# Patient Record
Sex: MALE | Race: WHITE | Employment: FULL TIME | URBAN - METROPOLITAN AREA
[De-identification: names, ages, dates, MRNs, and addresses within clinical notes are randomized per-mention and may not be internally consistent; named-entity substitution may affect disease eponyms.]

---

## 2017-04-14 PROBLEM — R16.1 SPLENOMEGALY: Status: ACTIVE | Noted: 2017-04-14

## 2018-12-10 ENCOUNTER — HOSPITAL ENCOUNTER (OUTPATIENT)
Dept: PHYSICAL THERAPY | Age: 53
Setting detail: THERAPIES SERIES
Discharge: HOME | End: 2018-12-10
Payer: COMMERCIAL

## 2018-12-10 ENCOUNTER — TELEPHONE (OUTPATIENT)
Dept: ORTHOPEDIC SURGERY | Age: 53
End: 2018-12-10

## 2018-12-10 PROCEDURE — G8979 MOBILITY GOAL STATUS: HCPCS | Performed by: PHYSICAL THERAPIST

## 2018-12-10 PROCEDURE — 97110 THERAPEUTIC EXERCISES: CPT | Performed by: PHYSICAL THERAPIST

## 2018-12-10 PROCEDURE — 97161 PT EVAL LOW COMPLEX 20 MIN: CPT | Performed by: PHYSICAL THERAPIST

## 2018-12-10 PROCEDURE — 97112 NEUROMUSCULAR REEDUCATION: CPT | Performed by: PHYSICAL THERAPIST

## 2018-12-10 PROCEDURE — 97016 VASOPNEUMATIC DEVICE THERAPY: CPT | Performed by: PHYSICAL THERAPIST

## 2018-12-10 PROCEDURE — G8978 MOBILITY CURRENT STATUS: HCPCS | Performed by: PHYSICAL THERAPIST

## 2018-12-10 NOTE — FLOWSHEET NOTE
[address], [address]  Phone: [phone]   Fax:     [phone]    Physical Therapy Daily Treatment Note  Date:  12/10/2018    Patient Name:  Dawood Nieves    :  1965  MRN: [id number]  Restrictions/Precautions:    Medical/Treatment Diagnosis Information:  · Diagnosis: 719.06, M25.462 - Effusion, left knee   · Treatment Diagnosis: Left knee pain, effuson Y64.071/200.38  Insurance/Certification information:  PT Insurance Information: [facility]   Physician Information:  Referring Practitioner: Max Herrera PA-C   Plan of care signed (Y/N):     Date of Patient follow up with Physician: barb Quinonez     G-Code (if applicable):      Date G-Code Applied:  12/10  PT G-Codes  Functional Assessment Tool Used: LEFS  Score: 31/0 - 61.25% deficit   Functional Limitation: Mobility: Walking and moving around  Mobility: Walking and Moving Around Current Status ():  At least 60 percent but less than 80 percent impaired, limited or restricted  Mobility: Walking and Moving Around Goal Status (): 0 percent impaired, limited or restricted    Progress Note: [x]  Yes  []  No  Next due by: Visit #10       Latex Allergy:  [x]NO      []YES  Preferred Language for Healthcare:   [x]English       []other:    Visit # Insurance Allowable   1 12     Pain level:  3-10/10     SUBJECTIVE:  See eval    OBJECTIVE: See eval  Observation:   Test measurements:      RESTRICTIONS/PRECAUTIONS:     Exercises/Interventions:   Exercise/Equipment Resistance/Repetitions Other comments   Stretching     Hamstring 5x30\"     Towel Pull 5x30\"     Inclined Calf     Hip Flexion     ITB     Groin     Quad                    SLR     Supine 3x10     Abduction 3x10     Adduction     Prone     SLR+          Isometrics     Quad sets 10x10\"          Patellar Glides     Medial     Superior     Inferior          ROM     Sheet Pulls 10x10\" Progression Towards Functional goals:  [] Patient is progressing as expected towards functional goals listed. [] Progression is slowed due to complexities listed. [] Progression has been slowed due to co-morbidities. [x] Plan just implemented, too soon to assess goals progression  [] Other:     ASSESSMENT:  See eval    Treatment/Activity Tolerance:  [x] Patient tolerated treatment well [] Patient limited by fatique  [] Patient limited by pain  [] Patient limited by other medical complications  [] Other:     Patient education:  12/10 Patient education on PT and plan of care including diagnosis, prognosis, treatment goals and options. Patient agrees with discussed POC and treatment and is aware of rehab process. Pt was also educated on clinic layout and use of modalities. Prognosis: [x] Good [] Fair  [] Poor    Patient Requires Follow-up: [x] Yes  [] No    PLAN: See cheko  [] Continue per plan of care [] Alter current plan (see comments)  [x] Plan of care initiated [] Hold pending MD visit [] Discharge    Electronically signed by: Evangelina Chan PT, DPT    *If patient does not return for further follow ups after this date. Please consider this as the patients discharge from physical therapy.

## 2018-12-10 NOTE — PLAN OF CARE
The [address] [place] [address]  Phone [phone]  Fax [phone]                                                       Physical Therapy Certification    Dear Referring Practitioner: Jung Olsen PA-C ,    We had the pleasure of evaluating the following patient for physical therapy services at [address] [place] [address]. A summary of our findings can be found in the initial assessment below. This includes our plan of care. If you have any questions or concerns regarding these findings, please do not hesitate to contact me at the office phone number checked above. Thank you for the referral.       Physician Signature:_______________________________Date:__________________  By signing above (or electronic signature), therapists plan is approved by physician      Patient: Dayana Luong   : 1965   MRN: [id number]  Referring Physician: Referring Practitioner: Jung Olsen PA-C       Evaluation Date: 12/10/2018      Medical Diagnosis Information:  Diagnosis: 719.06, M25.462 - Effusion, left knee    Treatment Diagnosis: Left knee pain, effuson M25.462/719.06                                         Insurance information: PT Insurance Information: [facility]      Precautions/ Contra-indications:   Latex Allergy:  [x]NO      []YES  Preferred Language for Healthcare:   [x]English       []other:    SUBJECTIVE: Patient stated complaint: Pt reports he injured his knee when he stepped in a hole in a parking lot - he twisted his left knee. He finished out the work night but then took 2 nights off. When he did go back to work he noticed increased swelling and pain. Ice and compression did seem to help. He has been getting \"pinching\" in the knee joint line. He states his knee progressively gets worse over the week. Currently, he is on light duty for work.       Injury - strength training, ROM, for Lower extremity and core   [x]  NMR activation and proprioception for LE, Glutes and Core   [x]  Manual therapy as indicated for LE, Hip and spine to include: Dry Needling/IASTM, STM, PROM, Gr I-IV mobilizations, manipulation. [x] Modalities as needed that may include: thermal agents, E-stim, Biofeedback, US, iontophoresis as indicated  [x] Patient education on joint protection, postural re-education, activity modification, progression of HEP. HEP instruction: (see scanned forms)    GOALS:  Patient stated goal: Return to work without pain or restriction     Therapist goals for Patient:   Short Term Goals: To be achieved in: 2-4 weeks  1. Independent in HEP and progression per patient tolerance, in order to prevent re-injury. 2. Patient will have a decrease in pain to facilitate improvement in movement, function, and ADLs as indicated by Functional Deficits. Long Term Goals: To be achieved in: 6-8 weeks  1. Disability index score of 20% or less for the LEFS to assist with reaching prior level of function. 2. Patient will demonstrate increased AROM to WNL (0-110 deg) to allow for proper joint functioning as indicated by patients Functional Deficits. 3. Patient will demonstrate an increase in Strength to good proximal hip strength and control in LE (MMT at least 4+/5) to allow for proper functional mobility as indicated by patients Functional Deficits. 4. Patient will return to daily functional activities without increased symptoms or restriction. 5. Patient will return to work without restrictions.      Electronically signed by:  Evangelina Chan PT

## 2018-12-11 ENCOUNTER — OFFICE VISIT (OUTPATIENT)
Dept: ORTHOPEDIC SURGERY | Age: 53
End: 2018-12-11
Payer: COMMERCIAL

## 2018-12-11 VITALS
BODY MASS INDEX: 28.23 KG/M2 | HEIGHT: 74 IN | DIASTOLIC BLOOD PRESSURE: 86 MMHG | HEART RATE: 72 BPM | SYSTOLIC BLOOD PRESSURE: 130 MMHG | WEIGHT: 220 LBS

## 2018-12-11 DIAGNOSIS — M25.562 LEFT KNEE PAIN, UNSPECIFIED CHRONICITY: Primary | ICD-10-CM

## 2018-12-11 PROCEDURE — 99204 OFFICE O/P NEW MOD 45 MIN: CPT | Performed by: ORTHOPAEDIC SURGERY

## 2018-12-11 NOTE — PROGRESS NOTES
Father     Hypertension Father     Kidney Disease Father        Social History     Social History    Marital status:      Spouse name: N/A    Number of children: N/A    Years of education: N/A     Social History Main Topics    Smoking status: Never Smoker    Smokeless tobacco: Never Used    Alcohol use Yes    Drug use: No    Sexual activity: Not Asked     Other Topics Concern    None     Social History Narrative    None       Current Outpatient Prescriptions   Medication Sig Dispense Refill    hydrochlorothiazide (HYDRODIURIL) 25 MG tablet Take 25 mg by mouth daily      lisinopril (PRINIVIL;ZESTRIL) 20 MG tablet Take 20 mg by mouth daily      Fexofenadine-Pseudoephedrine (ALLEGRA-D PO) Take by mouth      pravastatin (PRAVACHOL) 40 MG tablet Take 40 mg by mouth daily      aspirin 81 MG tablet Take 81 mg by mouth daily      tamsulosin (FLOMAX) 0.4 MG capsule Take 0.4 mg by mouth daily      omeprazole (PRILOSEC) 20 MG delayed release capsule Take 20 mg by mouth daily       No current facility-administered medications for this visit. No Known Allergies    Vital signs:  /86   Pulse 72   Ht 6' 2\" (1.88 m)   Wt 220 lb (99.8 kg)   BMI 28.25 kg/m²        Neuro: Alert & oriented x 3,  normal,  no focal deficits noted. Normal affect. Eyes: sclera clear  Ears: Normal external ear  Mouth:  No perioral lesions  Pulm: Respirations unlabored and regular  Pulse: Regular rate    Skin: Warm, well perfused        Left knee exam    Gait: No use of assistive devices. No antalgic gait. Alignment: Alignment appreciated. Inspection/skin: Quadriceps well developed. Skin is intact without erythema or ecchymosis. No gross deformity. Palpation: Minimal patellofemoral crepitus, positive tenderness along the medial joint line. Nontender along joint line. No pain with compression of patella. Nontender to light touch.   He has tenderness to palpation over the medial femoral epicondyles and fracture, soft tissue calcification or any other acute osseous pathology. Assessment: Left knee pain, suspect medial meniscus tear, and or grade 1 MCL sprain - injury 5 weeks ago    Plan: I discussed treatment options the patient. I recommended we obtain an MRI of his knee to rule out a medial meniscus tear. He is MRI is approved through worker's compensation for this. I will see him back following this test to discuss the imaging results. Jeannine Turk is in agreement with this plan. All questions were answered to patient's satisfaction and was encouraged to call with any further questions. Beatrice Pitts. Yvette Morrison, [address] [facility] Sports Medicine and [facility]     This dictation was performed with a verbal recognition program [facility] [employer] [facility]) and it was checked for errors. It is possible that there are still dictated errors within this office note. If so, please bring any errors to my attention for an addendum. All efforts were made to ensure that this office note is accurate. I supervised my sports medicine fellow in the evaluation and development of a treatment plan  for this patient. I personally interviewed the patient and performed a physical examination. In addition, I discussed the patient's condition and treatment options with them. All of their questions were answered. I personally reviewed the patient's pain scale, review of systems, family history, social history, past medical history, allergies and medications. 13 point review of systems was collected today and is filed in the medical record. Kahlil Hill MD  Sports Medicine, Arthroscopic Knee and Shoulder Surgery    This dictation was performed with a verbal recognition program [facility] [employer] [facility]) and it was checked for errors. It is possible that there are still dictated errors within this office note. If so, please bring any errors to my attention for an addendum.   All efforts were made to ensure that this office note is accurate.

## 2018-12-13 ENCOUNTER — HOSPITAL ENCOUNTER (OUTPATIENT)
Dept: PHYSICAL THERAPY | Age: 53
Setting detail: THERAPIES SERIES
Discharge: HOME | End: 2018-12-13
Payer: COMMERCIAL

## 2018-12-13 PROCEDURE — 97110 THERAPEUTIC EXERCISES: CPT | Performed by: PHYSICAL THERAPIST

## 2018-12-13 PROCEDURE — 97016 VASOPNEUMATIC DEVICE THERAPY: CPT | Performed by: PHYSICAL THERAPIST

## 2018-12-13 PROCEDURE — 97112 NEUROMUSCULAR REEDUCATION: CPT | Performed by: PHYSICAL THERAPIST

## 2018-12-13 NOTE — FLOWSHEET NOTE
[facility], [address] [employer] [address] [place] [address], [address] [place] [address]  Phone: [phone]   Fax:     [phone]    Physical Therapy Daily Treatment Note  Date:  2018    Patient Name:  Sarah Gan    :  1965  MRN: [id number]  Restrictions/Precautions:    Medical/Treatment Diagnosis Information:  · Diagnosis: 719.06, M25.462 - Effusion, left knee   · Treatment Diagnosis: Left knee pain, effuson D48.546/278.35  Insurance/Certification information:  PT Insurance Information: [facility]   Physician Information:  Referring Practitioner: Mahogany Shanks PA-C   Plan of care signed (Y/N):     Date of Patient follow up with Physician: sees Dr. Arlen Guzman     G-Code (if applicable):      Date G-Code Applied:  12/10       Progress Note: [x]  Yes  []  No  Next due by: Visit #10       Latex Allergy:  [x]NO      []YES  Preferred Language for Healthcare:   [x]English       []other:    Visit # Insurance Allowable   2   12     Pain level:  2-3/10     SUBJECTIVE:  Pt reports he was able to do his exercises without issues. Saw Dr. Arlen Guzman yesterday - getting his MRI Saturday and will follow up with him again afterward.      OBJECTIVE: See eval  Observation:   Test measurements:      RESTRICTIONS/PRECAUTIONS:     Exercises/Interventions:   Exercise/Equipment Resistance/Repetitions Other comments   Stretching     Hamstring 5x30\"     Towel Pull 5x30\"     Inclined Calf NPV     Hip Flexion     ITB     Groin     Quad                    SLR     Supine 3x10     Abduction 3x10     Adduction     Prone     SLR+ NPV          Isometrics     Quad sets 10x10\"     Ball squeeze  3x10  Added              Patellar Glides     Medial     Superior     Inferior          ROM     Sheet Pulls 10x10\"     Hang Weights     Passive     Active     Weight Shift     Ankle Pumps                    CKC     Calf raises 3x10  Added /   Wall sits     Step ups     1 leg stand

## 2018-12-18 ENCOUNTER — HOSPITAL ENCOUNTER (OUTPATIENT)
Dept: PHYSICAL THERAPY | Age: 53
Setting detail: THERAPIES SERIES
Discharge: HOME | End: 2018-12-18
Payer: COMMERCIAL

## 2018-12-18 PROCEDURE — 97110 THERAPEUTIC EXERCISES: CPT | Performed by: PHYSICAL THERAPIST

## 2018-12-18 PROCEDURE — 97112 NEUROMUSCULAR REEDUCATION: CPT | Performed by: PHYSICAL THERAPIST

## 2018-12-18 PROCEDURE — 97016 VASOPNEUMATIC DEVICE THERAPY: CPT | Performed by: PHYSICAL THERAPIST

## 2018-12-18 NOTE — FLOWSHEET NOTE
therapy to mobilize LE, proximal hip and/or LS spine soft tissue/joints for the purpose of modulating pain, promoting relaxation,  increasing ROM, reducing/eliminating soft tissue swelling/inflammation/restriction, improving soft tissue extensibility and allowing for proper ROM for normal function with self care, mobility, lifting and ambulation. Modalities:  Vaso 15'     Charges:  Timed Code Treatment Minutes: 45   Total Treatment Minutes: 331-771     919-246 2TE  229-320 NM  850-905 Vaso     [] EVAL (LOW) 69584 (typically 20 minutes face-to-face)  [] EVAL (MOD) 56832 (typically 30 minutes face-to-face)  [] EVAL (HIGH) 99711 (typically 45 minutes face-to-face)  [] RE-EVAL   [x] [place](49561) x  2   [] IONTO  [x] NMR (29240) x  1   [x] VASO  [] Manual (45578) x       [] Other:  [] TA x       [] Mech Traction (01315)  [] ES(attended) (52507)      [] ES (un) (11970):     GOALS:   Patient stated goal: Return to work without pain or restriction      Therapist goals for Patient:   Short Term Goals: To be achieved in: 2-4 weeks  1. Independent in HEP and progression per patient tolerance, in order to prevent re-injury. 2. Patient will have a decrease in pain to facilitate improvement in movement, function, and ADLs as indicated by Functional Deficits.     Long Term Goals: To be achieved in: 6-8 weeks  1. Disability index score of 20% or less for the LEFS to assist with reaching prior level of function. 2. Patient will demonstrate increased AROM to WNL (0-110 deg) to allow for proper joint functioning as indicated by patients Functional Deficits. 3. Patient will demonstrate an increase in Strength to good proximal hip strength and control in LE (MMT at least 4+/5) to allow for proper functional mobility as indicated by patients Functional Deficits. 4. Patient will return to daily functional activities without increased symptoms or restriction.        Progression Towards Functional goals:  [] Patient is progressing

## 2018-12-19 ENCOUNTER — TELEPHONE (OUTPATIENT)
Dept: ORTHOPEDIC SURGERY | Age: 53
End: 2018-12-19

## 2018-12-21 ENCOUNTER — HOSPITAL ENCOUNTER (OUTPATIENT)
Dept: PHYSICAL THERAPY | Age: 53
Setting detail: THERAPIES SERIES
Discharge: HOME | End: 2018-12-21
Payer: COMMERCIAL

## 2018-12-21 ENCOUNTER — OFFICE VISIT (OUTPATIENT)
Dept: ORTHOPEDIC SURGERY | Age: 53
End: 2018-12-21
Payer: COMMERCIAL

## 2018-12-21 VITALS
BODY MASS INDEX: 28.23 KG/M2 | DIASTOLIC BLOOD PRESSURE: 89 MMHG | HEIGHT: 74 IN | SYSTOLIC BLOOD PRESSURE: 139 MMHG | HEART RATE: 79 BPM | WEIGHT: 220 LBS

## 2018-12-21 DIAGNOSIS — S83.242A TEAR OF MEDIAL MENISCUS OF LEFT KNEE, UNSPECIFIED TEAR TYPE, UNSPECIFIED WHETHER OLD OR CURRENT TEAR, INITIAL ENCOUNTER: Primary | ICD-10-CM

## 2018-12-21 PROCEDURE — 97016 VASOPNEUMATIC DEVICE THERAPY: CPT | Performed by: PHYSICAL THERAPIST

## 2018-12-21 PROCEDURE — 97110 THERAPEUTIC EXERCISES: CPT | Performed by: PHYSICAL THERAPIST

## 2018-12-21 PROCEDURE — 99213 OFFICE O/P EST LOW 20 MIN: CPT | Performed by: ORTHOPAEDIC SURGERY

## 2018-12-21 PROCEDURE — 97112 NEUROMUSCULAR REEDUCATION: CPT | Performed by: PHYSICAL THERAPIST

## 2018-12-21 NOTE — FLOWSHEET NOTE
The [address] [place], [place] [address], [address]  Phone: [phone]   Fax:     [phone]    Physical Therapy Daily Treatment Note  Date:  2018    Patient Name:  Seabron Oppenheim    :  1965  MRN: [id number]  Restrictions/Precautions:    Medical/Treatment Diagnosis Information:  · Diagnosis: 719.06, M25.462 - Effusion, left knee   · Treatment Diagnosis: Left knee pain, effuson V29.673/812.66  Insurance/Certification information:  PT Insurance Information: [facility]   Physician Information:  Referring Practitioner: Alycia Reardon PA-C   Plan of care signed (Y/N):     Date of Patient follow up with Physician: barb Nassar     G-Code (if applicable):      Date G-Code Applied:  12/10       Progress Note: [x]  Yes  []  No  Next due by: Visit #10       Latex Allergy:  [x]NO      []YES  Preferred Language for Healthcare:   [x]English       []other:    Visit # Insurance Allowable   4   12     Pain level:  4-5/10     SUBJECTIVE:  Knee feels about the same. MD called with MRI results this week - large meniscal tear and he will need surgery.         OBJECTIVE: See eval  Observation:   Test measurements:      RESTRICTIONS/PRECAUTIONS:     Exercises/Interventions:   Exercise/Equipment Resistance/Repetitions Other comments   Stretching     Hamstring 5x30\"     Towel Pull     Inclined Calf 5x30\"   Added 12   Hip Flexion     ITB     Groin     Quad                    SLR     Supine 3x10 2# ^    Abduction 3x10 2# ^    Adduction     Prone     SLR+ 3x20\"  Added 12/18        Isometrics     Quad sets 10x10\"     Ball squeeze  3x10  Added 12/             Patellar Glides     Medial     Superior     Inferior          ROM     Sheet Pulls 10x10\"     Hang Weights     Passive     Active     Weight Shift     Ankle Pumps                    CKC     Calf raises 3x10  Added 12/   Wall sits     Step ups     1 leg stand Squatting     CC TKE     Balance Tandem 3x30\" each  Added 12/18   Rocker board 5x30\" 2way Added 12/21        PRE     Extension  RANGE:   Flexion 3x10 2# RANGE: avail ^ 12/21        Quantum machines     Leg press      Leg extension     Leg curl          Manual interventions                 Therapeutic Exercise and NMR EXR  [x] (15028) Provided verbal/tactile cueing for activities related to strengthening, flexibility, endurance, ROM for improvements in LE, proximal hip, and core control with self care, mobility, lifting, ambulation.  [] (10031) Provided verbal/tactile cueing for activities related to improving balance, coordination, kinesthetic sense, posture, motor skill, proprioception  to assist with LE, proximal hip, and core control in self care, mobility, lifting, ambulation and eccentric single leg control.      NMR and Therapeutic Activities:    [x] (54052 or 66891) Provided verbal/tactile cueing for activities related to improving balance, coordination, kinesthetic sense, posture, motor skill, proprioception and motor activation to allow for proper function of core, proximal hip and LE with self care and ADLs  [] (90910) Gait Re-education- Provided training and instruction to the patient for proper LE, core and proximal hip recruitment and positioning and eccentric body weight control with ambulation re-education including up and down stairs     Home Exercise Program:    [x] (56367) Reviewed/Progressed HEP activities related to strengthening, flexibility, endurance, ROM of core, proximal hip and LE for functional self-care, mobility, lifting and ambulation/stair navigation   [] (96775)Reviewed/Progressed HEP activities related to improving balance, coordination, kinesthetic sense, posture, motor skill, proprioception of core, proximal hip and LE for self care, mobility, lifting, and ambulation/stair navigation      Manual Treatments:  PROM / STM / Oscillations-Mobs:  G-I, II, III, IV (PA's, Inf., Post.)  []

## 2018-12-21 NOTE — PROGRESS NOTES
Chief Complaint  Follow-up (mri left knee. symptoms unchanged.)      History of Present Illness:  Kathyrn Mcburney is a pleasant 46 y.o. male here today for followup of left knee. On 10/31/2018 he stepped into a hole in a parking lot at work and twisted his left knee. Despite conservative treatment he continues to have significant left knee pain as well as locking and catching. An MRI was ordered to rule out a medial meniscus tear. He's here today to discuss his MRI results and treatment options. Medical History:  Patient's medications, allergies, past medical, surgical, social and family histories were reviewed and updated as appropriate. Pertinent items are noted in HPI  Review of systems reviewed from Patient History Form dated on 12/21/2018 and available in the patient's chart under the Media tab. Vital Signs:  Vitals:    12/21/18 0835   BP: 139/89   Pulse: 79         Neuro: Alert & oriented x 3,  normal,  no focal deficits noted. Normal affect. Eyes: sclera clear  Ears: Normal external ear  Mouth:  No perioral lesions  Pulm: Respirations unlabored and regular  Pulse: Regular rate and rhythm   Skin: Warm, well perfused      Constitutional: In no apparent distress. Normal affect. Alert and oriented X3 and is cooperative. LEFT knee exam    Gait: No use of assistive devices. No antalgic gait. Alignment: normal alignment. Inspection/skin: Skin is intact without erythema or ecchymosis. No gross deformity. Palpation: no crepitus. Tenderness about the medial joint line. Nontender along the lateral joint line. Range of Motion: There is full range of motion. Strength: Normal quadriceps development. Effusion: No effusion or swelling present. Ligamentous stability: No cruciate or collateral ligament instability. Neurologic and vascular: Skin is warm and well-perfused. Sensation is intact to light-touch. Special tests: Positive Kyaw sign.        RIGHT knee

## 2018-12-24 ENCOUNTER — HOSPITAL ENCOUNTER (OUTPATIENT)
Dept: PHYSICAL THERAPY | Age: 53
Setting detail: THERAPIES SERIES
Discharge: HOME | End: 2018-12-24
Payer: COMMERCIAL

## 2018-12-24 PROCEDURE — 97110 THERAPEUTIC EXERCISES: CPT | Performed by: PHYSICAL THERAPIST

## 2018-12-24 PROCEDURE — 97112 NEUROMUSCULAR REEDUCATION: CPT | Performed by: PHYSICAL THERAPIST

## 2018-12-24 PROCEDURE — 97016 VASOPNEUMATIC DEVICE THERAPY: CPT | Performed by: PHYSICAL THERAPIST

## 2018-12-24 NOTE — FLOWSHEET NOTE
[facility], [address] [employer] [address] [place] [address]  Phone: [phone]   Fax:     [phone]    Physical Therapy Daily Treatment Note  Date:  2018    Patient Name:  Mable Hoang    :  1965  MRN: [id number]  Restrictions/Precautions:    Medical/Treatment Diagnosis Information:  · Diagnosis: 719.06, M25.462 - Effusion, left knee   · Treatment Diagnosis: Left knee pain, effuson E65.571/255.79  Insurance/Certification information:  PT Insurance Information: [facility]   Physician Information:  Referring Practitioner: Connor Rodriguez PA-C   Plan of care signed (Y/N):     Date of Patient follow up with Physician: barb Rodrigues     G-Code (if applicable):      Date G-Code Applied:  12/10       Progress Note: [x]  Yes  []  No  Next due by: Visit #10       Latex Allergy:  [x]NO      []YES  Preferred Language for Healthcare:   [x]English       []other:    Visit # Insurance Allowable   5   12     Pain level:  2-3/10     SUBJECTIVE:  No new issues today.   Waiting for approval for the surgery       OBJECTIVE: See eval  Observation:   Test measurements:      RESTRICTIONS/PRECAUTIONS:     Exercises/Interventions:   Exercise/Equipment Resistance/Repetitions Other comments   Stretching     Hamstring 5x30\"     Towel Pull     Inclined Calf 5x30\"   Added    Hip Flexion     ITB     Groin     Quad 5x30\"  Added                   SLR     Supine 3x10 2# ^    Abduction 3x10 2# ^    Adduction     Prone     SLR+ 3x20\"  Added         Isometrics     Quad sets 10x10\"     Ball squeeze  3x10  Added              Patellar Glides     Medial     Superior     Inferior          ROM     Sheet Pulls 10x10\"     Hang Weights     Passive     Active     Weight Shift     Ankle Pumps                    CKC     Calf raises 3x10  Added    Wall sits 3x20\"  Added    Step ups     1 leg stand     Squatting     CC TKE progressing as expected towards functional goals listed. [] Progression is slowed due to complexities listed. [] Progression has been slowed due to co-morbidities. [x] Plan just implemented, too soon to assess goals progression  [] Other:     ASSESSMENT:  See eval    Treatment/Activity Tolerance:  [x] Patient tolerated treatment well [] Patient limited by fatique  [] Patient limited by pain  [] Patient limited by other medical complications  [x] Other: Pt reports fatigue by the end of session. Progress strength and balance program in preparation for surgery. Reviewed with pt the benefits of increasing strength before surgery for optimal outcomes. 12/24        Patient education:  12/10 Patient education on PT and plan of care including diagnosis, prognosis, treatment goals and options. Patient agrees with discussed POC and treatment and is aware of rehab process. Pt was also educated on clinic layout and use of modalities. Prognosis: [x] Good [] Fair  [] Poor    Patient Requires Follow-up: [x] Yes  [] No    PLAN: See eval  [x] Continue per plan of care [] Alter current plan (see comments)  [] Plan of care initiated [] Hold pending MD visit [] Discharge    Electronically signed by: Jessica Gann PT, DPT    *If patient does not return for further follow ups after this date. Please consider this as the patients discharge from physical therapy.

## 2018-12-27 ENCOUNTER — HOSPITAL ENCOUNTER (OUTPATIENT)
Dept: PHYSICAL THERAPY | Age: 53
Setting detail: THERAPIES SERIES
Discharge: HOME | End: 2018-12-27
Payer: COMMERCIAL

## 2018-12-27 PROCEDURE — 97110 THERAPEUTIC EXERCISES: CPT | Performed by: PHYSICAL THERAPIST

## 2018-12-27 PROCEDURE — 97112 NEUROMUSCULAR REEDUCATION: CPT | Performed by: PHYSICAL THERAPIST

## 2018-12-27 PROCEDURE — 97016 VASOPNEUMATIC DEVICE THERAPY: CPT | Performed by: PHYSICAL THERAPIST

## 2018-12-27 NOTE — FLOWSHEET NOTE
Patient is progressing as expected towards functional goals listed. [] Progression is slowed due to complexities listed. [] Progression has been slowed due to co-morbidities. [x] Plan just implemented, too soon to assess goals progression  [] Other:     ASSESSMENT:  See eval    Treatment/Activity Tolerance:  [x] Patient tolerated treatment well [] Patient limited by fatique  [] Patient limited by pain  [] Patient limited by other medical complications  [x] Other: Pt did report some discomfort in his knee while perform leg press today - reviewed with pt to not push through pain and to decreased resistance to alleviate symptoms. Educated him that pushing through pain will not make his knee stronger, but in turn may exacerbated symptoms. 12/27        Patient education:  12/10 Patient education on PT and plan of care including diagnosis, prognosis, treatment goals and options. Patient agrees with discussed POC and treatment and is aware of rehab process. Pt was also educated on clinic layout and use of modalities. Prognosis: [x] Good [] Fair  [] Poor    Patient Requires Follow-up: [x] Yes  [] No    PLAN: See eval  [x] Continue per plan of care [] Alter current plan (see comments)  [] Plan of care initiated [] Hold pending MD visit [] Discharge    Electronically signed by: Antonia Ring PT, DPT    *If patient does not return for further follow ups after this date. Please consider this as the patients discharge from physical therapy.

## 2019-01-02 ENCOUNTER — HOSPITAL ENCOUNTER (OUTPATIENT)
Dept: PHYSICAL THERAPY | Age: 54
Setting detail: THERAPIES SERIES
Discharge: HOME | End: 2019-01-02
Payer: COMMERCIAL

## 2019-01-02 PROCEDURE — 97112 NEUROMUSCULAR REEDUCATION: CPT | Performed by: PHYSICAL THERAPIST

## 2019-01-02 PROCEDURE — 97110 THERAPEUTIC EXERCISES: CPT | Performed by: PHYSICAL THERAPIST

## 2019-01-02 PROCEDURE — 97016 VASOPNEUMATIC DEVICE THERAPY: CPT | Performed by: PHYSICAL THERAPIST

## 2019-01-04 ENCOUNTER — HOSPITAL ENCOUNTER (OUTPATIENT)
Dept: PHYSICAL THERAPY | Age: 54
Setting detail: THERAPIES SERIES
Discharge: HOME | End: 2019-01-04
Payer: COMMERCIAL

## 2019-01-04 PROCEDURE — 97110 THERAPEUTIC EXERCISES: CPT | Performed by: PHYSICAL THERAPIST

## 2019-01-04 PROCEDURE — 97530 THERAPEUTIC ACTIVITIES: CPT | Performed by: PHYSICAL THERAPIST

## 2019-01-04 PROCEDURE — 97016 VASOPNEUMATIC DEVICE THERAPY: CPT | Performed by: PHYSICAL THERAPIST

## 2019-01-07 ENCOUNTER — HOSPITAL ENCOUNTER (OUTPATIENT)
Dept: PHYSICAL THERAPY | Age: 54
Setting detail: THERAPIES SERIES
Discharge: HOME | End: 2019-01-07
Payer: COMMERCIAL

## 2019-01-07 PROCEDURE — 97530 THERAPEUTIC ACTIVITIES: CPT | Performed by: PHYSICAL THERAPIST

## 2019-01-07 PROCEDURE — 97016 VASOPNEUMATIC DEVICE THERAPY: CPT | Performed by: PHYSICAL THERAPIST

## 2019-01-07 PROCEDURE — 97110 THERAPEUTIC EXERCISES: CPT | Performed by: PHYSICAL THERAPIST

## 2019-01-10 ENCOUNTER — TELEPHONE (OUTPATIENT)
Dept: ORTHOPEDIC SURGERY | Age: 54
End: 2019-01-10

## 2019-01-10 ENCOUNTER — HOSPITAL ENCOUNTER (OUTPATIENT)
Dept: PHYSICAL THERAPY | Age: 54
Setting detail: THERAPIES SERIES
Discharge: HOME | End: 2019-01-10
Payer: COMMERCIAL

## 2019-01-10 PROCEDURE — 97110 THERAPEUTIC EXERCISES: CPT | Performed by: PHYSICAL THERAPIST

## 2019-01-10 PROCEDURE — 97530 THERAPEUTIC ACTIVITIES: CPT | Performed by: PHYSICAL THERAPIST

## 2019-01-10 PROCEDURE — 97016 VASOPNEUMATIC DEVICE THERAPY: CPT | Performed by: PHYSICAL THERAPIST

## 2019-01-14 ENCOUNTER — HOSPITAL ENCOUNTER (OUTPATIENT)
Dept: PHYSICAL THERAPY | Age: 54
Setting detail: THERAPIES SERIES
Discharge: HOME | End: 2019-01-14
Payer: COMMERCIAL

## 2019-01-14 PROCEDURE — 97016 VASOPNEUMATIC DEVICE THERAPY: CPT | Performed by: PHYSICAL THERAPIST

## 2019-01-14 PROCEDURE — 97530 THERAPEUTIC ACTIVITIES: CPT | Performed by: PHYSICAL THERAPIST

## 2019-01-14 PROCEDURE — 97110 THERAPEUTIC EXERCISES: CPT | Performed by: PHYSICAL THERAPIST

## 2019-01-17 ENCOUNTER — HOSPITAL ENCOUNTER (OUTPATIENT)
Dept: PHYSICAL THERAPY | Age: 54
Setting detail: THERAPIES SERIES
Discharge: HOME | End: 2019-01-17
Payer: COMMERCIAL

## 2019-01-17 PROCEDURE — G8979 MOBILITY GOAL STATUS: HCPCS | Performed by: PHYSICAL THERAPIST

## 2019-01-17 PROCEDURE — 97530 THERAPEUTIC ACTIVITIES: CPT | Performed by: PHYSICAL THERAPIST

## 2019-01-17 PROCEDURE — 97110 THERAPEUTIC EXERCISES: CPT | Performed by: PHYSICAL THERAPIST

## 2019-01-17 PROCEDURE — G8978 MOBILITY CURRENT STATUS: HCPCS | Performed by: PHYSICAL THERAPIST

## 2019-01-17 PROCEDURE — 97016 VASOPNEUMATIC DEVICE THERAPY: CPT | Performed by: PHYSICAL THERAPIST

## 2019-04-10 NOTE — PROGRESS NOTES
[facility] [employer] / [facility] ([facility]) [address] [facility], [address]    Acknowledgment of Informed Consent for Surgical or Medical Procedure and Sedation  I agree to allow doctor(s) Selena Howard and his/her associates or assistants, including residents and/or other qualified medical practitioner to perform the following medical treatment or procedure and to administer or direct the administration of sedation as necessary:  Procedure(s): LEFT KNEE ARTHROSCOPY, MEDIAL MENISCUS EXCISION VERSUS REPAIR, SYNOVECTOMY, CHONDROPLASTY  My doctor has explained the following regarding the proposed procedure:   the explanation of the procedure   the benefits of the procedure   the potential problems that might occur during recuperation   the risks and side effects of the procedure which could include but are not limited to severe blood loss, infection, stroke or death   the benefits, risks and side effect of alternative procedures including the consequences of declining this procedure or any alternative procedures   the likelihood of achieving satisfactory results. I acknowledge no guarantee or assurance has been made to me regarding the results. I understand that during the course of this treatment/procedure, unforeseen conditions can occur which require an additional or different procedure. I agree to allow my physician or assistants to perform such extension of the original procedure as they may find necessary. I understand that sedation will often result in temporary impairment of memory and fine motor skills and that sedation can occasionally progress to a state of deep sedation or general anesthesia. I understand the risks of anesthesia for surgery include, but are not limited to, sore throat, hoarseness, injury to face, mouth, or teeth; nausea; headache; injury to blood vessels or nerves; death, brain damage, or paralysis.     I understand that if I have a Limitation of Treatment order in effect during my hospitalization, the order may or may not be in effect during this procedure. I give my doctor permission to give me blood or blood products. I understand that there are risks with receiving blood such as hepatitis, AIDS, fever, or allergic reaction. I acknowledge that the risks, benefits, and alternatives of this treatment have been explained to me and that no express or implied warranty has been given by the hospital, any blood bank, or any person or entity as to the blood or blood components transfused. At the discretion of my doctor, I agree to allow observers, equipment/product representatives and allow photographing, and/or televising of the procedure, provided my name or identity is maintained confidentially. I agree the hospital may dispose of or use for scientific or educational purposes any tissue, fluid, or body parts which may be removed.     ________________________________Date________Time______ am/pm  ([facility] [facility])  Patient or Signature of Closest Relative or Legal Guardian    ________________________________Date________Time______am/pm      Page 1 of  1  Witness

## 2019-04-12 ENCOUNTER — OFFICE VISIT (OUTPATIENT)
Dept: ORTHOPEDIC SURGERY | Age: 54
End: 2019-04-12
Payer: COMMERCIAL

## 2019-04-12 VITALS
SYSTOLIC BLOOD PRESSURE: 125 MMHG | HEIGHT: 74 IN | BODY MASS INDEX: 28.23 KG/M2 | WEIGHT: 220 LBS | DIASTOLIC BLOOD PRESSURE: 86 MMHG | HEART RATE: 79 BPM

## 2019-04-12 DIAGNOSIS — S83.242A TEAR OF MEDIAL MENISCUS OF LEFT KNEE, UNSPECIFIED TEAR TYPE, UNSPECIFIED WHETHER OLD OR CURRENT TEAR, INITIAL ENCOUNTER: Primary | ICD-10-CM

## 2019-04-12 DIAGNOSIS — M25.562 LEFT KNEE PAIN, UNSPECIFIED CHRONICITY: ICD-10-CM

## 2019-04-12 PROCEDURE — E0114 CRUTCH UNDERARM PAIR NO WOOD: HCPCS | Performed by: ORTHOPAEDIC SURGERY

## 2019-04-12 PROCEDURE — 99213 OFFICE O/P EST LOW 20 MIN: CPT | Performed by: ORTHOPAEDIC SURGERY

## 2019-04-12 NOTE — PROGRESS NOTES
Chief Complaint  Follow-up (left knee. no changes in pain or symptoms. )      History of Present Illness:  Leonor Merchant is a pleasant 46 y.o. male today for follow-up of left knee. He has a known left knee medial meniscus tear that is symptomatic. On 10/31/2018 he stepped into a hole in a parking lot at work and twisted his left knee. Since that time he's had persistent left knee pain as well as a catching/pinching in his left knee. We have an MRI dated 12/15/2018 confirming the medial meniscus tear. No new injuries reported. Occupation is a  and is currently working light duty. Denies any personal or family history of blood clots. Denies any personal history of bleeding disorders. He stopped taking his baby aspirin. No history of lower extremities surgeries in the past. Denies any allergies to narcotics. Is electing to do postoperative physical therapy at the [facility] location. He is planning to have his preoperative physical the morning of surgery at the hospital - because he has a family history of heart disease at the urgent care would not clear him for this, but denies any personal of heart disease. Medical History:  Patient's medications, allergies, past medical, surgical, social and family histories were reviewed and updated as appropriate. Pertinent items are noted in HPI  Review of systems reviewed from Patient History Form dated on 4/12/2019 and available in the patient's chart under the Media tab. Vital Signs:  Vitals:    04/12/19 0850   BP: 125/86   Pulse: 79         Neuro: Alert & oriented x 3,  normal,  no focal deficits noted. Normal affect. Eyes: sclera clear  Ears: Normal external ear  Mouth:  No perioral lesions  Pulm: Respirations unlabored and regular  Pulse: Regular rate and rhythm   Skin: Warm, well perfused      Constitutional: In no apparent distress. Normal affect. Alert and oriented X3 and is cooperative.        LEFT knee exam    Gait: No use of chondromalacia of the medial compartment       Assessment :  34-year-old male with left knee symptomatic medial meniscus tear. Impression:  Encounter Diagnoses   Name Primary?  Tear of medial meniscus of left knee, unspecified tear type, unspecified whether old or current tear, initial encounter Yes    Left knee pain, unspecified chronicity        Office Procedures:  Orders Placed This Encounter   Procedures    Aluminum Crutches     Patient was prescribed [employer]line Aluminum Crutches. This mobility device is required for the following reasons:    Patient has mobility limitations that significantly impair their ability to participate in one or more mobility related activities of daily living. The patient is able to safely use the mobility device. Functional mobility deficit can be sufficiently resolved with the use of this device. Verbal and written instructions for the use of and application of this item were provided. The patient was educated and fit by a healthcare professional with expert knowledge and specialization in brace application while under the direct supervision of the treating physician. They were instructed to contact the office immediately should the equipment result in increased pain, decreased sensation, increased swelling or worsening of the condition. Plan: MRI was re-reviewed and recorrelated with physical exam findings. We believe he will benefit from left knee medial meniscectomy, synovectomy, and chondroplasty. Discussed that he may still be somewhat symptomatic from the arthritis in his knee, he verbally acknowledges understanding. General postoperative timeline was reviewed. Followup first week postop or sooner if needed. Sylvia Calderon is in agreement with this plan. All questions were answered to patient's satisfaction and was encouraged to call with any further questions.       Risks, benefits and potential complications of arthroscopic knee surgery were discussed

## 2019-04-12 NOTE — PROGRESS NOTES
wax for 72 hours prior to procedure near your operative site  18. FOR WOMAN OF CHILDBEARING AGE ONLY- please bring a urine sample with you on day of surgery or make sure we can collect on arrival.    If you have further questions, you may contact us at [phone]    Left instructions on patient's voicemail. Meenu Mercer. 4/12/2019 .2:57 PM      H&P DOS- WORKMAN'S COMP - OK PER JULIENNE

## 2019-04-12 NOTE — PROGRESS NOTES
Review of Systems   Cardiovascular:        High blood pressure    Musculoskeletal: Positive for joint swelling. Left knee pain    All other systems reviewed and are negative.

## 2019-04-16 ENCOUNTER — ANESTHESIA EVENT (OUTPATIENT)
Dept: OPERATING ROOM | Age: 54
End: 2019-04-16
Payer: COMMERCIAL

## 2019-04-17 ENCOUNTER — ANESTHESIA (OUTPATIENT)
Dept: OPERATING ROOM | Age: 54
End: 2019-04-17
Payer: COMMERCIAL

## 2019-04-17 ENCOUNTER — HOSPITAL ENCOUNTER (OUTPATIENT)
Age: 54
Setting detail: OUTPATIENT SURGERY
Discharge: HOME | End: 2019-04-17
Attending: ORTHOPAEDIC SURGERY | Admitting: ORTHOPAEDIC SURGERY
Payer: COMMERCIAL

## 2019-04-17 VITALS
WEIGHT: 220 LBS | TEMPERATURE: 97.5 F | OXYGEN SATURATION: 94 % | RESPIRATION RATE: 16 BRPM | BODY MASS INDEX: 28.23 KG/M2 | HEART RATE: 70 BPM | DIASTOLIC BLOOD PRESSURE: 77 MMHG | SYSTOLIC BLOOD PRESSURE: 121 MMHG | HEIGHT: 74 IN

## 2019-04-17 VITALS — DIASTOLIC BLOOD PRESSURE: 76 MMHG | SYSTOLIC BLOOD PRESSURE: 118 MMHG | OXYGEN SATURATION: 96 % | TEMPERATURE: 73.2 F

## 2019-04-17 PROCEDURE — 6360000002 HC RX W HCPCS: Performed by: ORTHOPAEDIC SURGERY

## 2019-04-17 PROCEDURE — 2709999900 HC NON-CHARGEABLE SUPPLY: Performed by: ORTHOPAEDIC SURGERY

## 2019-04-17 PROCEDURE — 7100000010 HC PHASE II RECOVERY - FIRST 15 MIN: Performed by: ORTHOPAEDIC SURGERY

## 2019-04-17 PROCEDURE — 2720000010 HC SURG SUPPLY STERILE: Performed by: ORTHOPAEDIC SURGERY

## 2019-04-17 PROCEDURE — 7100000000 HC PACU RECOVERY - FIRST 15 MIN: Performed by: ORTHOPAEDIC SURGERY

## 2019-04-17 PROCEDURE — 7100000001 HC PACU RECOVERY - ADDTL 15 MIN: Performed by: ORTHOPAEDIC SURGERY

## 2019-04-17 PROCEDURE — 7100000011 HC PHASE II RECOVERY - ADDTL 15 MIN: Performed by: ORTHOPAEDIC SURGERY

## 2019-04-17 PROCEDURE — 6360000002 HC RX W HCPCS: Performed by: NURSE ANESTHETIST, CERTIFIED REGISTERED

## 2019-04-17 PROCEDURE — 3600000014 HC SURGERY LEVEL 4 ADDTL 15MIN: Performed by: ORTHOPAEDIC SURGERY

## 2019-04-17 PROCEDURE — 2580000003 HC RX 258: Performed by: ORTHOPAEDIC SURGERY

## 2019-04-17 PROCEDURE — 3600000004 HC SURGERY LEVEL 4 BASE: Performed by: ORTHOPAEDIC SURGERY

## 2019-04-17 PROCEDURE — 2580000003 HC RX 258: Performed by: ANESTHESIOLOGY

## 2019-04-17 PROCEDURE — 3700000001 HC ADD 15 MINUTES (ANESTHESIA): Performed by: ORTHOPAEDIC SURGERY

## 2019-04-17 PROCEDURE — 3700000000 HC ANESTHESIA ATTENDED CARE: Performed by: ORTHOPAEDIC SURGERY

## 2019-04-17 RX ORDER — FENTANYL CITRATE 50 UG/ML
INJECTION, SOLUTION INTRAMUSCULAR; INTRAVENOUS PRN
Status: DISCONTINUED | OUTPATIENT
Start: 2019-04-17 | End: 2019-04-17 | Stop reason: SDUPTHER

## 2019-04-17 RX ORDER — PROPOFOL 10 MG/ML
INJECTION, EMULSION INTRAVENOUS PRN
Status: DISCONTINUED | OUTPATIENT
Start: 2019-04-17 | End: 2019-04-17 | Stop reason: SDUPTHER

## 2019-04-17 RX ORDER — DEXAMETHASONE SODIUM PHOSPHATE 4 MG/ML
INJECTION, SOLUTION INTRA-ARTICULAR; INTRALESIONAL; INTRAMUSCULAR; INTRAVENOUS; SOFT TISSUE PRN
Status: DISCONTINUED | OUTPATIENT
Start: 2019-04-17 | End: 2019-04-17 | Stop reason: SDUPTHER

## 2019-04-17 RX ORDER — LIDOCAINE HYDROCHLORIDE 20 MG/ML
INJECTION, SOLUTION INTRAVENOUS PRN
Status: DISCONTINUED | OUTPATIENT
Start: 2019-04-17 | End: 2019-04-17 | Stop reason: SDUPTHER

## 2019-04-17 RX ORDER — MIDAZOLAM HYDROCHLORIDE 1 MG/ML
INJECTION INTRAMUSCULAR; INTRAVENOUS PRN
Status: DISCONTINUED | OUTPATIENT
Start: 2019-04-17 | End: 2019-04-17 | Stop reason: SDUPTHER

## 2019-04-17 RX ORDER — FENTANYL CITRATE 50 UG/ML
25 INJECTION, SOLUTION INTRAMUSCULAR; INTRAVENOUS EVERY 5 MIN PRN
Status: DISCONTINUED | OUTPATIENT
Start: 2019-04-17 | End: 2019-04-17 | Stop reason: HOSPADM

## 2019-04-17 RX ORDER — ONDANSETRON 2 MG/ML
4 INJECTION INTRAMUSCULAR; INTRAVENOUS
Status: DISCONTINUED | OUTPATIENT
Start: 2019-04-17 | End: 2019-04-17 | Stop reason: HOSPADM

## 2019-04-17 RX ORDER — ROPIVACAINE HYDROCHLORIDE 5 MG/ML
INJECTION, SOLUTION EPIDURAL; INFILTRATION; PERINEURAL PRN
Status: DISCONTINUED | OUTPATIENT
Start: 2019-04-17 | End: 2019-04-17 | Stop reason: ALTCHOICE

## 2019-04-17 RX ORDER — SODIUM CHLORIDE, SODIUM LACTATE, POTASSIUM CHLORIDE, CALCIUM CHLORIDE 600; 310; 30; 20 MG/100ML; MG/100ML; MG/100ML; MG/100ML
INJECTION, SOLUTION INTRAVENOUS CONTINUOUS
Status: DISCONTINUED | OUTPATIENT
Start: 2019-04-17 | End: 2019-04-17 | Stop reason: HOSPADM

## 2019-04-17 RX ORDER — FENTANYL CITRATE 50 UG/ML
50 INJECTION, SOLUTION INTRAMUSCULAR; INTRAVENOUS EVERY 5 MIN PRN
Status: DISCONTINUED | OUTPATIENT
Start: 2019-04-17 | End: 2019-04-17 | Stop reason: HOSPADM

## 2019-04-17 RX ORDER — M-VIT,TX,IRON,MINS/CALC/FOLIC 27MG-0.4MG
1 TABLET ORAL DAILY
COMMUNITY

## 2019-04-17 RX ORDER — SODIUM CHLORIDE 0.9 % (FLUSH) 0.9 %
10 SYRINGE (ML) INJECTION PRN
Status: DISCONTINUED | OUTPATIENT
Start: 2019-04-17 | End: 2019-04-17 | Stop reason: HOSPADM

## 2019-04-17 RX ORDER — SODIUM CHLORIDE 0.9 % (FLUSH) 0.9 %
10 SYRINGE (ML) INJECTION EVERY 12 HOURS SCHEDULED
Status: DISCONTINUED | OUTPATIENT
Start: 2019-04-17 | End: 2019-04-17 | Stop reason: HOSPADM

## 2019-04-17 RX ORDER — SODIUM CHLORIDE, SODIUM LACTATE, POTASSIUM CHLORIDE, AND CALCIUM CHLORIDE .6; .31; .03; .02 G/100ML; G/100ML; G/100ML; G/100ML
IRRIGANT IRRIGATION PRN
Status: DISCONTINUED | OUTPATIENT
Start: 2019-04-17 | End: 2019-04-17 | Stop reason: ALTCHOICE

## 2019-04-17 RX ORDER — ONDANSETRON 2 MG/ML
INJECTION INTRAMUSCULAR; INTRAVENOUS PRN
Status: DISCONTINUED | OUTPATIENT
Start: 2019-04-17 | End: 2019-04-17 | Stop reason: SDUPTHER

## 2019-04-17 RX ORDER — PROMETHAZINE HYDROCHLORIDE 25 MG/ML
6.25 INJECTION, SOLUTION INTRAMUSCULAR; INTRAVENOUS
Status: DISCONTINUED | OUTPATIENT
Start: 2019-04-17 | End: 2019-04-17 | Stop reason: HOSPADM

## 2019-04-17 RX ORDER — LIDOCAINE HYDROCHLORIDE 10 MG/ML
1 INJECTION, SOLUTION EPIDURAL; INFILTRATION; INTRACAUDAL; PERINEURAL
Status: DISCONTINUED | OUTPATIENT
Start: 2019-04-17 | End: 2019-04-17 | Stop reason: HOSPADM

## 2019-04-17 RX ADMIN — FENTANYL CITRATE 50 MCG: 50 INJECTION INTRAMUSCULAR; INTRAVENOUS at 09:50

## 2019-04-17 RX ADMIN — DEXAMETHASONE SODIUM PHOSPHATE 4 MG: 4 INJECTION, SOLUTION INTRAMUSCULAR; INTRAVENOUS at 09:22

## 2019-04-17 RX ADMIN — LIDOCAINE HYDROCHLORIDE 60 MG: 20 INJECTION, SOLUTION INTRAVENOUS at 09:10

## 2019-04-17 RX ADMIN — PROPOFOL 200 MG: 10 INJECTION, EMULSION INTRAVENOUS at 09:10

## 2019-04-17 RX ADMIN — FENTANYL CITRATE 100 MCG: 50 INJECTION INTRAMUSCULAR; INTRAVENOUS at 09:10

## 2019-04-17 RX ADMIN — Medication 2 G: at 09:04

## 2019-04-17 RX ADMIN — MIDAZOLAM HYDROCHLORIDE 2 MG: 2 INJECTION, SOLUTION INTRAMUSCULAR; INTRAVENOUS at 09:04

## 2019-04-17 RX ADMIN — ONDANSETRON 4 MG: 2 INJECTION INTRAMUSCULAR; INTRAVENOUS at 09:04

## 2019-04-17 RX ADMIN — FENTANYL CITRATE 25 MCG: 50 INJECTION INTRAMUSCULAR; INTRAVENOUS at 09:35

## 2019-04-17 RX ADMIN — FENTANYL CITRATE 25 MCG: 50 INJECTION INTRAMUSCULAR; INTRAVENOUS at 09:42

## 2019-04-17 RX ADMIN — SODIUM CHLORIDE, POTASSIUM CHLORIDE, SODIUM LACTATE AND CALCIUM CHLORIDE: 600; 310; 30; 20 INJECTION, SOLUTION INTRAVENOUS at 07:15

## 2019-04-17 ASSESSMENT — PULMONARY FUNCTION TESTS
PIF_VALUE: 1
PIF_VALUE: 15
PIF_VALUE: 1
PIF_VALUE: 1
PIF_VALUE: 17
PIF_VALUE: 15
PIF_VALUE: 26
PIF_VALUE: 18
PIF_VALUE: 15
PIF_VALUE: 17
PIF_VALUE: 0
PIF_VALUE: 15
PIF_VALUE: 1
PIF_VALUE: 25
PIF_VALUE: 2
PIF_VALUE: 22
PIF_VALUE: 15
PIF_VALUE: 20
PIF_VALUE: 23
PIF_VALUE: 1
PIF_VALUE: 22
PIF_VALUE: 20
PIF_VALUE: 17
PIF_VALUE: 3
PIF_VALUE: 1
PIF_VALUE: 15
PIF_VALUE: 2
PIF_VALUE: 22
PIF_VALUE: 10
PIF_VALUE: 17
PIF_VALUE: 15
PIF_VALUE: 1
PIF_VALUE: 18
PIF_VALUE: 1
PIF_VALUE: 18
PIF_VALUE: 15
PIF_VALUE: 4
PIF_VALUE: 18
PIF_VALUE: 16
PIF_VALUE: 15
PIF_VALUE: 17
PIF_VALUE: 15
PIF_VALUE: 2
PIF_VALUE: 21
PIF_VALUE: 1
PIF_VALUE: 17
PIF_VALUE: 20
PIF_VALUE: 1
PIF_VALUE: 15
PIF_VALUE: 16
PIF_VALUE: 2
PIF_VALUE: 19
PIF_VALUE: 21
PIF_VALUE: 17
PIF_VALUE: 17
PIF_VALUE: 16
PIF_VALUE: 15
PIF_VALUE: 19
PIF_VALUE: 1
PIF_VALUE: 15
PIF_VALUE: 17
PIF_VALUE: 15
PIF_VALUE: 17
PIF_VALUE: 21
PIF_VALUE: 16
PIF_VALUE: 15
PIF_VALUE: 16
PIF_VALUE: 15
PIF_VALUE: 15
PIF_VALUE: 17
PIF_VALUE: 25
PIF_VALUE: 1
PIF_VALUE: 17

## 2019-04-17 ASSESSMENT — PAIN DESCRIPTION - ORIENTATION: ORIENTATION: LEFT

## 2019-04-17 ASSESSMENT — LIFESTYLE VARIABLES: SMOKING_STATUS: 0

## 2019-04-17 ASSESSMENT — PAIN DESCRIPTION - DESCRIPTORS: DESCRIPTORS: ACHING;DISCOMFORT

## 2019-04-17 ASSESSMENT — PAIN DESCRIPTION - LOCATION: LOCATION: KNEE

## 2019-04-17 ASSESSMENT — PAIN SCALES - GENERAL
PAINLEVEL_OUTOF10: 2
PAINLEVEL_OUTOF10: 0

## 2019-04-17 ASSESSMENT — PAIN DESCRIPTION - PAIN TYPE: TYPE: SURGICAL PAIN

## 2019-04-17 ASSESSMENT — PAIN - FUNCTIONAL ASSESSMENT: PAIN_FUNCTIONAL_ASSESSMENT: 0-10

## 2019-04-17 NOTE — PROGRESS NOTES
Ambulatory Surgery/Procedure Discharge Note    Vitals:    04/17/19 1117   BP: 121/77   Pulse: 70   Resp: 16   Temp: 97.5 °F (36.4 °C)   SpO2: 94%       In: 1380 [P.O.:180; I.V.:1200]  Out: 25     Restroom use offered before discharge. Yes. pt has no urge to void at this time. Pain assessment:  present - adequately treated  Pain Level: 2  Pt brought to [facility] awake and alert. No c/o nausea. Pain 2/10. Left leg dressing intact with ace wrap. Ice bags in place. Toes warm. Pt given soda and crackers, tolerated well. Instructions given to pt and friend. Pt up and dressed with assistance. Patient discharged to home/self care.  Patient discharged via wheel chair by transporter to waiting family/S.O.       4/17/2019 12:28 PM

## 2019-04-17 NOTE — PROGRESS NOTES
PACU Transfer to [facility]    Vitals:    04/17/19 1100   BP: 118/73   Pulse: 72   Resp: 15   Temp: 97.6 °F (36.4 °C)   SpO2: 94%         Intake/Output Summary (Last 24 hours) at 4/17/2019 1108  Last data filed at 4/17/2019 1100  Gross per 24 hour   Intake 1380 ml   Output 25 ml   Net 1355 ml       Pain assessment:  {  Pain Level:  0(Patient denied pain on admission to PACU)    Patient transferred to care of RADHAMES RN.    4/17/2019 11:08 AM

## 2019-04-17 NOTE — PROGRESS NOTES
Patient admitted to PACU # 11 from Or at 1021 post left knee arthroscopy per Dr. Cesar Rai. Attached to PACU monitoring system and report received from CRNA. Patient was hemodynamically stable during surgical procedure.   Arrived in PACU only sl drowsy and with no initial c/o pain;

## 2019-04-17 NOTE — ANESTHESIA POSTPROCEDURE EVALUATION
Department of Anesthesiology  Postprocedure Note    Patient: Edgardo Merchant  MRN: [id number]  YOB: 1965  Date of evaluation: 4/17/2019  Time:  12:26 PM     Procedure Summary     Date:  04/17/19 Room / Location:  Critical access hospital OR 10 / Critical access hospital OR    Anesthesia Start:  2045 Anesthesia Stop:  1688    Procedure:  LEFT KNEE ARTHROSCOPY, MEDIAL MENISCUS EXCISION, SYNOVECTOMY, CHONDROPLASTY  (Left Knee) Diagnosis:  (medial meniscus tear left knee)    Surgeon:  Rosina Ortega MD Responsible Provider:  Nicci Castillo MD    Anesthesia Type:  general ASA Status:  2          Anesthesia Type: general    Shanna Phase I: Shanna Score: 10    Shanna Phase II: Shanna Score: 10    Last vitals: Reviewed and per EMR flowsheets.        Anesthesia Post Evaluation    Patient location during evaluation: PACU  Patient participation: complete - patient participated  Level of consciousness: awake and alert  Pain score: 0  Airway patency: patent  Nausea & Vomiting: no nausea and no vomiting  Complications: no  Cardiovascular status: blood pressure returned to baseline  Respiratory status: acceptable  Hydration status: stable

## 2019-04-17 NOTE — OP NOTE
[address] [address], [address]                                OPERATIVE REPORT    PATIENT NAME: Stefano Jarvis                    :        1965  MED REC NO:   [id number]                          ROOM:  ACCOUNT NO:   [de-identified]                           ADMIT DATE: 2019  PROVIDER:     Byron Grayson MD    DATE OF PROCEDURE:  2019    OPERATIONS PERFORMED:  Left knee arthroscopy; medial meniscectomy;  synovectomy; chondroplasty, medial femoral condyle; chondroplasty,  patella; major synovectomy, medial, lateral, and anterior compartments. SURGEON:  CASPER [place] Cardinal:  Dr. Senia Modi. ANESTHESIA:  General.    PREOPERATIVE DIAGNOSES:  Medial meniscus tear, patellofemoral  chondrosis, medial compartment arthritis, and reactive synovitis. POSTOPERATIVE DIAGNOSES:  Medial meniscus tear, patellofemoral  chondrosis, medial compartment arthritis, and reactive synovitis. PREPARATION:  ChloraPrep. INDICATION:  The patient is a 50-year-old man who has mechanical  symptoms related to a 4 cm MRI-documented medial meniscus tear, presents  for arthroscopic evaluation and treatment. Risks and benefits of  surgery as well as nonsurgical alternatives were discussed in detail  with the patient who understood and consented to the operation. OPERATIVE PROCEDURE:  The patient was seen in the holding area. He  confirmed the left knee was the operative extremity. We initialed the  operative site. He was taken to the OR. After induction of general  anesthesia, a tourniquet was placed on the left thigh. Left leg was  prepped and draped in a sterile fashion. Timeout was performed. OR  team agreed the left knee was the operative site. Initials were  verified. The leg was exsanguinated with an Esmarch bandage. Tourniquet was inflated to 300 mmHg. Incision was made. Scope was  placed in the joint.   Knee was visualized sequentially. Grade 2A change  was present in the central region of the patella and a chondroplasty of  this area was carried out. Reactive synovitis present medially,  laterally, and anteriorly and a three-compartment synovectomy was  carried out using synovial shaver. Lateral meniscus was probed and  noted to be normal.  Area of mild fibrillation appreciated on the medial  aspect of the lateral femoral condyle. Tibia was uninvolved. ACL was  intact. Medial meniscus showed a large pedunculated tear involving the  mid body and posterior horn. Partial medial meniscectomy was carried  out in which the unstable fragments were trimmed back to stable base of  tissue. Grade 2B change was present over the medial femoral condyle and  loose fragments of articular cartilage were debrided down to stable base  of tissue. Grade 2A change was present over the medial tibial plateau. The meniscal tibial recess was evaluated. No loose body was appreciated  in this area. Scope was removed from the joint. Incision was closed  with Monocryl sutures. Sterile dressing was applied. The patient was  awakened and taken to the recovery room in stable condition.   The sponge  and needle counts were correct at the conclusion of the procedure and  blood loss was minimal.        Maliha Suarez MD    D: 04/17/2019 10:19:49       T: 04/17/2019 14:04:11     [email or web address]  Job#: [id number]     Doc#: [id number]    CC:

## 2019-04-17 NOTE — BRIEF OP NOTE
Brief Postoperative Note  ______________________________________________________________    Patient: Eliezer Miller  YOB: 1965  MRN: [id number]  Date of Procedure: 4/17/2019    Pre-Op Diagnosis: medial meniscus tear left knee    Post-Op Diagnosis: Same       Procedure(s):  LEFT KNEE ARTHROSCOPY, MEDIAL MENISCUS EXCISION, SYNOVECTOMY, CHONDROPLASTY    Anesthesia: Anesthesia type not filed in the log. Surgeon(s):   MD Viky Valero MD    Assistant: Chon Tay SA    Estimated Blood Loss (mL): less than 50     Complications: None    Specimens:   * No specimens in log *    Implants:  * No implants in log *      Drains: * No LDAs found *    Findings: please see operative note    Sahara Ford PA-C  Date: 4/17/2019  Time: 8:53 AM

## 2019-04-17 NOTE — PROGRESS NOTES
The following education and goals will be achieved upon completion of the patient's care in [facility]:    IdIdentify the learner who is being assessed for education: patient  Ability to Learn:  Exhibits ability to grasp concepts and respond to questions: High  Ready to Learn: Yes  anxious  Preferred Method of Learning:  verbal  Barriers to Learning: Verbalizes interest  Special Considerations due to cultural, Restoration, spiritual beliefs:  no  Language: English  : moses    [address] [facility] Goals  [x]Appropriate evaluation / integration of data as delineated by ASPAN Standards of [address] [place] [address] Pain scale and pain management  [x]Patient will verbalize understanding of pain scale and pain management. [x]Pre-operative determination of patients anticipated Post-Operative pain goal: 0 of 10 on 10 point scale post op goal  [x]Patient will verbalize plan for pain management  []Other   Compliance with Pre-op Instructions - Patient reports compliance with:  [x]Taking prescribed home meds before arrival  []Surgical prep instructions specific to the patient's surgery    Fall Risk - PreOperatively   []No preoperative risk identified  [x]Preop risk identified:      [] Sensory deficit     [] Motor deficit     [] Balance problem     [] Home medication     []Uses assistive device to ambulate      []History of a Fall within the last 30 days  [x]Due to Perioperative medication administration    Goal(s) for fall prevention:  [x] Prevent fall or injury by use of encouraging call light for assistance, side rails, and assisting with activity. [x]Patient / Significant other verbalize understanding of need to call for assistance prior to getting out of bed.     Infection Precautions                                                                                                   [x] Patient understands implementation of [facility] Site Infection precautions  [x] Handwashing, skin prep prior to IV insertion, hair clipped at surgical site if needed  [x] Pre op antibiotic, if ordered    Patient Safety  [x] Patient identification  [x] Site verification (See Universal Protocol Checklist)  [x] General Safety precautions  [x] Side rails up, bed/stretcher in low position, wheels locked  [x] Call light within reach  [x] Patient instructed to call for assistance prior to getting out of bed    Instructions - Discharge planning for Outpatients  [x] Patient / significant other voices understanding of home care and follow up procedures.   Anticipated Special Needs upon discharge:  [] Cooling device  [] Crutches  [] Walker  [] Wound Support device   []Drain  []Other   [x] See [facility] Ambulatory Procedure Discharge Instructions    Instructions - Discharge planning for Admitted Patients  [] Patient / Significant other understands plan for admission after surgery  [x] Patient / Significant other understands plan for anticipated discharge disposition                   4/17/2019 7:30 AM

## 2019-04-17 NOTE — PROGRESS NOTES
CLINICAL PHARMACY NOTE: MEDS TO [address] Select Patient?: No  Total # of Prescriptions Filled: 1   The following medications were delivered to the patient:  · Percocet  Total # of Interventions Completed: 1  Time Spent (min): 15    Additional Documentation:

## 2019-04-17 NOTE — H&P
Ruthy Hunt    [id number]      Department of General Surgery    Surgical Services     Pre-operative History and Physical      INDICATION:   medial meniscus tear left knee    PROCEDURE:  MD KNEE SCOPE,DIAGNOSTIC [26032] (KNEE ARTHROSCOPY)    CHIEF COMPLAINT:  Left knee pain    HISTORY:   Patient with left knee pain swelling, instability and pain after a twisting injury during the course of his employment. Weight loss/gain:  No  Recent Hx Steroid use: No  History of allergic reaction to anesthesia:  No    Past Medical History:        Diagnosis Date    Hyperlipidemia     Hypertension      Past Surgical History:    Left Shoulder Arthroscopy   Right ulna ORIF    Medications Prior to Admission:   Prior to Admission medications    Medication Sig Start Date End Date Taking? Authorizing Provider   Multiple Vitamins-Minerals (THERAPEUTIC MULTIVITAMIN-MINERALS) tablet Take 1 tablet by mouth daily   Yes Historical Provider, MD   hydrochlorothiazide (HYDRODIURIL) 25 MG tablet Take 25 mg by mouth daily   Yes Historical Provider, MD   lisinopril (PRINIVIL;ZESTRIL) 20 MG tablet Take 20 mg by mouth daily   Yes Historical Provider, MD   Fexofenadine-Pseudoephedrine (ALLEGRA-D PO) Take by mouth   Yes Historical Provider, MD   pravastatin (PRAVACHOL) 40 MG tablet Take 40 mg by mouth daily   Yes Historical Provider, MD   aspirin 81 MG tablet Take 81 mg by mouth daily   Yes Historical Provider, MD   omeprazole (PRILOSEC) 20 MG delayed release capsule Take 20 mg by mouth daily   Yes Historical Provider, MD       Allergies:  Patient has no known allergies.     Social History:   Social History     Socioeconomic History    Marital status:      Spouse name: None    Number of children: None    Years of education: None    Highest education level: None   Occupational History    None   Social Needs    Financial resource strain: None    Food insecurity:     Worry: None     Inability: None    Transportation needs: Medical: None     Non-medical: None   Tobacco Use    Smoking status: Never Smoker    Smokeless tobacco: Never Used   Substance and Sexual Activity    Alcohol use: Yes    Drug use: No    Sexual activity: None   Lifestyle    Physical activity:     Days per week: None     Minutes per session: None    Stress: None   Relationships    Social connections:     Talks on phone: None     Gets together: None     Attends Yarsani service: None     Active member of club or organization: None     Attends meetings of clubs or organizations: None     Relationship status: None    Intimate partner violence:     Fear of current or ex partner: None     Emotionally abused: None     Physically abused: None     Forced sexual activity: None   Other Topics Concern    None   Social History Narrative    None         Family History:       Problem Relation Age of Onset    Alcohol Abuse Mother     Cancer Mother     Coronary Art Dis Father     Diabetes Father     Hypertension Father     Kidney Disease Father          REVIEW OF SYSTEMS:    Constitutional: Negative for chills, fatigue and fever. HENT: Negative for loss of hearing   Eyes: Negative for visual disturbance. Respiratory: Negative for  cough, chest tightness, shortness of breath and wheezing. Cardiovascular: Negative for chest pain, palpitations and exertional chest pressure  Gastrointestinal: Negative for constipation, diarrhea, nausea and vomiting. Musculoskeletal: Negative for gait problem, Positive for left knee joint swelling. Skin: Negative for rash and nonhealing wounds. Neurological: Negative for dizziness, syncope, light-headedness,    Hematological: Does not bruise/bleed easily.    Psychiatric/Behavioral: Negative for agitation    PHYSICAL EXAM:      /72   Pulse 76   Temp 98.1 °F (36.7 °C) (Oral)   Resp 16   Ht 6' 2\" (1.88 m)   Wt 220 lb (99.8 kg)   SpO2 97%   BMI 28.25 kg/m²  I      Eyes:  pupils equal, round and reactive to light and conjunctiva normal    Head/ENT:  Normocephalic, without obvious abnormality, atramatic,     Neck:  Supple, symmetrical, trachea midline, no adenopathy,  skin warm and dry. Heart: regular rate and rhythm    Lungs:  No increased work of breathing, good air exchange, clear to auscultation bilaterally, no crackles or wheezing    Abdomen:  Normal bowel sounds, soft, non-distended, non-tender, no masses palpated    Extremities:  No clubbing, cyanosis, or edema      ASSESSMENT AND PLAN:    1. Patient is a 46 y.o. male with above specified procedure planned. 2.  Access to ancillary services are available per request of the provider.     Andrea Clements   4/17/2019

## 2019-04-17 NOTE — ANESTHESIA PRE PROCEDURE
Department of Anesthesiology  Preprocedure Note       Name:  Theresia Olszewski   Age:  46 y.o.  :  1965                                          MRN:  [id number]         Date:  2019      Surgeon: Margi Gandhi): Svetlana Barnes MD    Procedure: LEFT KNEE ARTHROSCOPY, MEDIAL MENISCUS EXCISION VERSUS REPAIR, SYNOVECTOMY, CHONDROPLASTY (Left )    Medications prior to admission:   Prior to Admission medications    Medication Sig Start Date End Date Taking? Authorizing Provider   hydrochlorothiazide (HYDRODIURIL) 25 MG tablet Take 25 mg by mouth daily    Historical Provider, MD   lisinopril (PRINIVIL;ZESTRIL) 20 MG tablet Take 20 mg by mouth daily    Historical Provider, MD   Fexofenadine-Pseudoephedrine (ALLEGRA-D PO) Take by mouth    Historical Provider, MD   pravastatin (PRAVACHOL) 40 MG tablet Take 40 mg by mouth daily    Historical Provider, MD   aspirin 81 MG tablet Take 81 mg by mouth daily    Historical Provider, MD   tamsulosin (FLOMAX) 0.4 MG capsule Take 0.4 mg by mouth daily    Historical Provider, MD   omeprazole (PRILOSEC) 20 MG delayed release capsule Take 20 mg by mouth daily    Historical Provider, MD       Current medications:    No current facility-administered medications for this encounter.       Current Outpatient Medications   Medication Sig Dispense Refill    hydrochlorothiazide (HYDRODIURIL) 25 MG tablet Take 25 mg by mouth daily      lisinopril (PRINIVIL;ZESTRIL) 20 MG tablet Take 20 mg by mouth daily      Fexofenadine-Pseudoephedrine (ALLEGRA-D PO) Take by mouth      pravastatin (PRAVACHOL) 40 MG tablet Take 40 mg by mouth daily      aspirin 81 MG tablet Take 81 mg by mouth daily      tamsulosin (FLOMAX) 0.4 MG capsule Take 0.4 mg by mouth daily      omeprazole (PRILOSEC) 20 MG delayed release capsule Take 20 mg by mouth daily         Allergies:  No Known Allergies    Problem List:    Patient Active Problem List   Diagnosis Code    Splenomegaly R16.1       Past Medical History: Diagnosis Date    Hyperlipidemia     Hypertension        Past Surgical History:  No past surgical history on file. Social History:    Social History     Tobacco Use    Smoking status: Never Smoker    Smokeless tobacco: Never Used   Substance Use Topics    Alcohol use: Yes                                Counseling given: Not Answered      Vital Signs (Current):   Vitals:    04/12/19 1452   Weight: 220 lb (99.8 kg)   Height: 6' 2\" (1.88 m)                                              BP Readings from Last 3 Encounters:   04/12/19 125/86   12/21/18 139/89   12/11/18 130/86       NPO Status:                                                                                 BMI:   Wt Readings from Last 3 Encounters:   04/12/19 220 lb (99.8 kg)   12/21/18 220 lb (99.8 kg)   12/11/18 220 lb (99.8 kg)     Body mass index is 28.25 kg/m². CBC:   Lab Results   Component Value Date    WBC 3.9 04/14/2017    RBC 5.15 04/14/2017    HGB 15.2 04/14/2017    HCT 48.2 04/14/2017    MCV 93.5 04/14/2017    RDW 13.7 04/14/2017     04/14/2017       CMP:   Lab Results   Component Value Date     04/14/2017    K 4.0 04/14/2017     04/14/2017    CO2 28 04/14/2017    BUN 19 04/14/2017    CREATININE 0.97 04/14/2017    AGRATIO 1.1 04/14/2017    LABGLOM >60.0 04/14/2017    GLUCOSE 123 04/14/2017    PROT 8.2 04/14/2017    CALCIUM 9.6 04/14/2017    BILITOT 1.1 04/14/2017    ALKPHOS 103 04/14/2017    AST 45 04/14/2017    ALT 52 04/14/2017       POC Tests: No results for input(s): POCGLU, POCNA, POCK, POCCL, POCBUN, POCHEMO, POCHCT in the last 72 hours.     Coags: No results found for: PROTIME, INR, APTT    HCG (If Applicable): No results found for: PREGTESTUR, PREGSERUM, HCG, HCGQUANT     ABGs: No results found for: PHART, PO2ART, VRK6AAN, PGS8FLD, BEART, K8MCKUXH     Type & Screen (If Applicable):  No results found for: LABABO, [address]    Anesthesia Evaluation  Patient summary reviewed and Nursing notes reviewed no

## 2019-04-19 ENCOUNTER — HOSPITAL ENCOUNTER (OUTPATIENT)
Dept: PHYSICAL THERAPY | Age: 54
Setting detail: THERAPIES SERIES
Discharge: HOME | End: 2019-04-19
Payer: COMMERCIAL

## 2019-04-19 ENCOUNTER — OFFICE VISIT (OUTPATIENT)
Dept: ORTHOPEDIC SURGERY | Age: 54
End: 2019-04-19

## 2019-04-19 VITALS — HEART RATE: 76 BPM | SYSTOLIC BLOOD PRESSURE: 128 MMHG | DIASTOLIC BLOOD PRESSURE: 85 MMHG

## 2019-04-19 DIAGNOSIS — Z98.890 S/P LEFT KNEE ARTHROSCOPY: Primary | ICD-10-CM

## 2019-04-19 PROCEDURE — 97016 VASOPNEUMATIC DEVICE THERAPY: CPT | Performed by: PHYSICAL THERAPIST

## 2019-04-19 PROCEDURE — 97161 PT EVAL LOW COMPLEX 20 MIN: CPT | Performed by: PHYSICAL THERAPIST

## 2019-04-19 PROCEDURE — 97112 NEUROMUSCULAR REEDUCATION: CPT | Performed by: PHYSICAL THERAPIST

## 2019-04-19 PROCEDURE — 99024 POSTOP FOLLOW-UP VISIT: CPT | Performed by: ORTHOPAEDIC SURGERY

## 2019-04-19 PROCEDURE — 97110 THERAPEUTIC EXERCISES: CPT | Performed by: PHYSICAL THERAPIST

## 2019-04-19 NOTE — PLAN OF CARE
The [address] [place] [address]  Phone [phone]  Fax [phone]                                                       Physical Therapy Certification    Dear Referring Practitioner: Dr. Niama Madden ,    We had the pleasure of evaluating the following patient for physical therapy services at [address] [place] [address]. A summary of our findings can be found in the initial assessment below. This includes our plan of care. If you have any questions or concerns regarding these findings, please do not hesitate to contact me at the office phone number checked above. Thank you for the referral.       Physician Signature:_______________________________Date:__________________  By signing above (or electronic signature), therapists plan is approved by physician      Patient: Nicole Haddad   : 1965   MRN: [id number]  Referring Physician: Referring Practitioner: Dr. Naima Madden       Evaluation Date: 2019      Medical Diagnosis Information:  Diagnosis: P47.105V Left meniscus tear, M25.562 Left knee pain    Treatment Diagnosis: Left knee pain, effuson M25.562/719.06                                         Insurance information: PT Insurance Information: [facility]      Precautions/ Contra-indications:   Latex Allergy:  [x]NO      []YES  Preferred Language for Healthcare:   [x]English       []other:    SUBJECTIVE: Patient stated complaint: Pt reports he injured his knee when he stepped in a hole in a parking lot - he twisted his left knee. He finished out the work night but then took 2 nights off. When he did go back to work he noticed increased swelling and pain. He states his knee progressively gets worse over time. He did do some physical therapy but symptoms persisted. He got approved for an MRI by [employer] and a meniscus tear was discovered.   He comes Patient has been instructed to contact their primary care physician regarding ROS issues if not already being addressed at this time. Co-morbidities/Complexities (which will affect course of rehabilitation):   []None           Arthritic conditions   []Rheumatoid arthritis (M05.9)  [x]Osteoarthritis (M19.91)   Cardiovascular conditions   [x]Hypertension (I10)  []Hyperlipidemia (E78.5)  []Angina pectoris (I20)  []Atherosclerosis (I70)   Musculoskeletal conditions   []Disc pathology   []Congenital spine pathologies   []Prior surgical intervention  []Osteoporosis (M81.8)  []Osteopenia (M85.8)   Endocrine conditions   []Hypothyroid (E03.9)  []Hyperthyroid Gastrointestinal conditions   []Constipation (G05.00)   Metabolic conditions   []Morbid obesity (E66.01)  []Diabetes type 1(E10.65) or 2 (E11.65)   []Neuropathy (G60.9)     Pulmonary conditions   []Asthma (J45)  []Coughing   []COPD (J44.9)   Psychological Disorders  []Anxiety (F41.9)  []Depression (F32.9)   []Other:   []Other:          Barriers to/and or personal factors that will affect rehab potential:              []Age  []Sex              []Motivation/Lack of Motivation                        []Co-Morbidities              []Cognitive Function, education/learning barriers              []Environmental, home barriers              []profession/work barriers  []past PT/medical experience  []other:  Justification:     Falls Risk Assessment (30 days):   [x] Falls Risk assessed and no intervention required.   [] Falls Risk assessed and Patient requires intervention due to being higher risk   TUG score (>12s at risk):     [] Falls education provided, including       G-Codes:  PT G-Codes  Functional Assessment Tool Used: LEFS  Score: ~60-80% deficit     ASSESSMENT:   Functional Impairments:     []Noted lumbar/proximal hip/LE hypomobility   [x]Decreased LE functional ROM   [x]Decreased core/proximal hip strength and neuromuscular control   [x]Decreased LE functional strength   [x]Reduced balance/proprioceptive control   []other:      Functional Activity Limitations (from functional questionnaire and intake)   [x]Reduced ability to tolerate prolonged functional positions   [x]Reduced ability or difficulty with changes of positions or transfers between positions   [x]Reduced ability to maintain good posture and demonstrate good body mechanics with sitting, bending, and lifting   [x]Reduced ability to sleep   [x] Reduced ability or tolerance with driving and/or computer work   [x]Reduced ability to perform lifting, carrying tasks   [x]Reduced ability to squat   []Reduced ability to forward bend   [x]Reduced ability to ambulate prolonged functional periods/distances/surfaces   [x]Reduced ability to ascend/descend stairs   []Reduced ability to run, hop or jump   []other:     Participation Restrictions   [x]Reduced participation in self care activities   [x]Reduced participation in home management activities   [x]Reduced participation in work activities   []Reduced participation in social activities. []Reduced participation in sport activities. Classification :    [x]Signs/symptoms consistent with post-surgical status including decreased ROM, strength and function.    []Signs/symptoms consistent with joint sprain/strain   []Signs/symptoms consistent with patella-femoral syndrome   []Signs/symptoms consistent with knee OA/hip OA   []Signs/symptoms consistent with internal derangement of knee/Hip   []Signs/symptoms consistent with functional hip weakness/NMR control      []Signs/symptoms consistent with tendinitis/tendinosis    []signs/symptoms consistent with pathology which may benefit from Dry needling      []other:      Prognosis/Rehab Potential:      []Excellent   [x]Good    []Fair   []Poor    Tolerance of evaluation/treatment:    []Excellent   [x]Good    []Fair   []Poor    Physical Therapy Evaluation Complexity Justification  [x] A history of present problem with:  [] no personal factors and/or comorbidities that impact the plan of care;  [x]1-2 personal factors and/or comorbidities that impact the plan of care  []3 personal factors and/or comorbidities that impact the plan of care  [x] An examination of body systems using standardized tests and measures addressing any of the following: body structures and functions (impairments), activity limitations, and/or participation restrictions;:  [] a total of 1-2 or more elements   [] a total of 3 or more elements   [x] a total of 4 or more elements   [x] A clinical presentation with:  [x] stable and/or uncomplicated characteristics   [] evolving clinical presentation with changing characteristics  [] unstable and unpredictable characteristics;   [x] Clinical decision making of [x] low, [] moderate, [] high complexity using standardized patient assessment instrument and/or measurable assessment of functional outcome. [x] EVAL (LOW) 68814 (typically 20 minutes face-to-face)  [] EVAL (MOD) 12224 (typically 30 minutes face-to-face)  [] EVAL (HIGH) 16298 (typically 45 minutes face-to-face)  [] RE-EVAL       PLAN  Frequency/Duration:  2 days per week for 4 Weeks:  Interventions:  [x]  Therapeutic exercise including: strength training, ROM, for Lower extremity and core   [x]  NMR activation and proprioception for LE, Glutes and Core   [x]  Manual therapy as indicated for LE, Hip and spine to include: Dry Needling/IASTM, STM, PROM, Gr I-IV mobilizations, manipulation. [x] Modalities as needed that may include: thermal agents, E-stim, Biofeedback, US, iontophoresis as indicated  [x] Patient education on joint protection, postural re-education, activity modification, progression of HEP. HEP instruction: (see scanned forms)    GOALS:  Patient stated goal: Pt will return to work without limitations     Therapist goals for Patient:   Short Term Goals: To be achieved in: 2 weeks  1.  Independent in HEP and progression per patient tolerance, in order to prevent re-injury. 2. Patient will have a decrease in pain to facilitate improvement in movement, function, and ADLs as indicated by Functional Deficits. Long Term Goals: To be achieved in: 6-8 weeks  1. Disability index score of 20% or less for the LEFS to assist with reaching prior level of function. 2. Patient will demonstrate increased AROM to WNL to allow for proper joint functioning as indicated by patients Functional Deficits. 3. Patient will demonstrate an increase in Strength to good proximal hip strength and control (MMT at least 4+/5)  in LE to allow for proper functional mobility as indicated by patients Functional Deficits. 4. Patient will return to daily functional activities without increased symptoms or restriction.        Electronically signed by:  Atul Lynn PT

## 2019-04-19 NOTE — PROGRESS NOTES
Chief Complaint  Post-Op Check (left knee. s/p )      History of Present Illness:  Sher Queen is a pleasant 46 y.o. male who presents today for follow up evaluation of left knee pain. He is 2 days out from a left arthroscopy, medial menisectomy, synovectomy, chondroplasty. He feels his pain is under control and is doing well thus far following surgery. He continues to ambulate with his crutches and take his ASA. Past Medical History:   Diagnosis Date    Hyperlipidemia     Hypertension         Past Surgical History:   Procedure Laterality Date    COLONOSCOPY      FRACTURE SURGERY Right     ulna    KNEE ARTHROSCOPY Left 4/17/2019    LEFT KNEE ARTHROSCOPY, MEDIAL MENISCUS EXCISION, SYNOVECTOMY, CHONDROPLASTY  performed by Dede Pacheco MD at [address] [facility] [address] Left     WISDOM TOOTH EXTRACTION         Family History   Problem Relation Age of Onset    Alcohol Abuse Mother     Cancer Mother     Coronary Art Dis Father     Diabetes Father     Hypertension Father     Kidney Disease Father        Social History     Socioeconomic History    Marital status:      Spouse name: None    Number of children: None    Years of education: None    Highest education level: None   Occupational History    None   Social Needs    Financial resource strain: None    Food insecurity:     Worry: None     Inability: None    Transportation needs:     Medical: None     Non-medical: None   Tobacco Use    Smoking status: Never Smoker    Smokeless tobacco: Never Used   Substance and Sexual Activity    Alcohol use:  Yes    Drug use: No    Sexual activity: None   Lifestyle    Physical activity:     Days per week: None     Minutes per session: None    Stress: None   Relationships    Social connections:     Talks on phone: None     Gets together: None     Attends Anabaptism service: None     Active member of club or organization: None     Attends meetings of clubs or organizations: None Relationship status: None    Intimate partner violence:     Fear of current or ex partner: None     Emotionally abused: None     Physically abused: None     Forced sexual activity: None   Other Topics Concern    None   Social History Narrative    None       Current Outpatient Medications   Medication Sig Dispense Refill    Multiple Vitamins-Minerals (THERAPEUTIC MULTIVITAMIN-MINERALS) tablet Take 1 tablet by mouth daily      hydrochlorothiazide (HYDRODIURIL) 25 MG tablet Take 25 mg by mouth daily      lisinopril (PRINIVIL;ZESTRIL) 20 MG tablet Take 20 mg by mouth daily      Fexofenadine-Pseudoephedrine (ALLEGRA-D PO) Take by mouth      pravastatin (PRAVACHOL) 40 MG tablet Take 40 mg by mouth daily      aspirin 81 MG tablet Take 81 mg by mouth daily      omeprazole (PRILOSEC) 20 MG delayed release capsule Take 20 mg by mouth daily       No current facility-administered medications for this visit. No Known Allergies    Vital signs:  /85   Pulse 76        Neuro: Alert & oriented x 3,  normal,  no focal deficits noted. Normal affect. Eyes: sclera clear  Ears: Normal external ear  Mouth:  No perioral lesions  Pulm: Respirations unlabored and regular  Pulse: Regular rate and rhythm   Skin: Warm, well perfused      Constitutional: In no apparent distress. Normal affect. Alert and oriented X3 and is cooperative. LEFT Knee Exam:    Gait: Ambulates with 2 crutches. Alignment: normal alignment. Inspection/skin: Skin is intact without erythema or ecchymosis. No gross deformity. Incisions are clean, dry and intact. Palpation: no crepitus. no joint line tenderness present. Range of Motion: There is full range of motion. Strength: Normal quadriceps development. Effusion: No effusion or swelling present. Ligamentous stability: No cruciate or collateral ligament instability. Neurologic and vascular: Skin is warm and well-perfused. Sensation is intact to light-touch. systems was collected and reviewed today. It is noncontributory. I personally performed the services described in this documentation and scribed by Sudie Homans, ATC. Sorin Fuentes MD  Sports Medicine, Arthroscopic Knee and Shoulder Surgery    This dictation was performed with a verbal recognition program [facility]) and it was checked for errors. It is possible that there are still dictated errors within this office note. If so, please bring any errors to my attention for an addendum. All efforts were made to ensure that this office note is accurate.

## 2019-04-19 NOTE — FLOWSHEET NOTE
[address] [facility], [facility] [address], [address] [place] [address]  Phone: [phone]   Fax:     [phone]    Physical Therapy Daily Treatment Note  Date:  2019    Patient Name:  Derrick Cochran    :  1965  MRN: [id number]  Restrictions/Precautions:    Medical/Treatment Diagnosis Information:  · Diagnosis: S83.242D Left meniscus tear, M25.562 Left knee pain   · DOS 19  · Treatment Diagnosis: Left knee pain, effuson J64.868/048.63  Insurance/Certification information:  PT Insurance Information: [facility]   Physician Information:  Referring Practitioner: Dr. Brayden Gillis of care signed (Y/N):     Date of Patient follow up with Physician:     G-Code (if applicable):      Date G-Code Applied:  19  PT G-Codes  Functional Assessment Tool Used: LEFS  Score: ~60-80% deficit     Progress Note: [x]  Yes  []  No  Next due by: Visit #10       Latex Allergy:  [x]NO      []YES  Preferred Language for Healthcare:   [x]English       []other:    Visit # Insurance Allowable   1 BW      Pain level:  5/10     SUBJECTIVE:  See eval    OBJECTIVE: See eval  Observation:   Test measurements:      RESTRICTIONS/PRECAUTIONS:     Exercises/Interventions:   Exercise/Equipment Resistance/Repetitions Other comments   Stretching     Hamstring 5x30\"     Towel Pull 5x30\"     Inclined Calf     Hip Flexion     ITB     Groin     Quad                    SLR     Supine 3x10    Abduction 3x10     Adduction BS 10x10\"     Prone     SLR+          Isometrics     Quad sets 10x10\"          Patellar Glides     Medial     Superior     Inferior          ROM     Sheet Pulls 10x10\"    Hang Weights     Passive     Active     Weight Shift     Ankle Pumps 30x                    CKC     Calf raises     Wall sits     Step ups     1 leg stand     Squatting     CC TKE     Balance     bridges          PRE     Extension  RANGE:   Flexion  RANGE:        Quantum machines     Leg press      Leg extension     Leg curl          Manual interventions                     Therapeutic Exercise and NMR EXR  [x] (66374) Provided verbal/tactile cueing for activities related to strengthening, flexibility, endurance, ROM for improvements in LE, proximal hip, and core control with self care, mobility, lifting, ambulation. [x] (73816) Provided verbal/tactile cueing for activities related to improving balance, coordination, kinesthetic sense, posture, motor skill, proprioception  to assist with LE, proximal hip, and core control in self care, mobility, lifting, ambulation and eccentric single leg control.      NMR and Therapeutic Activities:    [x] (85779 or 73638) Provided verbal/tactile cueing for activities related to improving balance, coordination, kinesthetic sense, posture, motor skill, proprioception and motor activation to allow for proper function of core, proximal hip and LE with self care and ADLs  [x] (07444) Gait Re-education- Provided training and instruction to the patient for proper LE, core and proximal hip recruitment and positioning and eccentric body weight control with ambulation re-education including up and down stairs     Home Exercise Program:    [x] (42578) Reviewed/Progressed HEP activities related to strengthening, flexibility, endurance, ROM of core, proximal hip and LE for functional self-care, mobility, lifting and ambulation/stair navigation   [] (12668)Reviewed/Progressed HEP activities related to improving balance, coordination, kinesthetic sense, posture, motor skill, proprioception of core, proximal hip and LE for self care, mobility, lifting, and ambulation/stair navigation      Manual Treatments:  PROM / STM / Oscillations-Mobs:  G-I, II, III, IV (PA's, Inf., Post.)  [] (24295) Provided manual therapy to mobilize LE, proximal hip and/or LS spine soft tissue/joints for the purpose of modulating pain, promoting relaxation,  increasing ROM, reducing/eliminating soft tissue swelling/inflammation/restriction, improving soft tissue extensibility and allowing for proper ROM for normal function with self care, mobility, lifting and ambulation. Modalities:  Vaso 15'     Charges:  Timed Code Treatment Minutes: 30   Total Treatment Minutes: 1665-5638     9718-7482 Eval  3070-7422 TE  5472-2663 NM   6380-6903 Vaso     [x] EVAL (LOW) 47874 (typically 20 minutes face-to-face)  [] EVAL (MOD) 69135 (typically 30 minutes face-to-face)  [] EVAL (HIGH) 97160 (typically 45 minutes face-to-face)  [] RE-EVAL   [x] LIZ(00562) x  1   [] IONTO  [x] NMR (58183) x  1   [x] VASO  [] Manual (69711) x       [] Other:  [] TA x       [] Mech Traction (74668)  [] ES(attended) (57836)      [] ES (un) (12539):     GOALS:   Patient stated goal: Pt will return to work without limitations     Therapist goals for Patient:   Short Term Goals: To be achieved in: 2 weeks  1. Independent in HEP and progression per patient tolerance, in order to prevent re-injury. 2. Patient will have a decrease in pain to facilitate improvement in movement, function, and ADLs as indicated by Functional Deficits. Long Term Goals: To be achieved in: 6-8 weeks  1. Disability index score of 20% or less for the LEFS to assist with reaching prior level of function. 2. Patient will demonstrate increased AROM to WNL to allow for proper joint functioning as indicated by patients Functional Deficits. 3. Patient will demonstrate an increase in Strength to good proximal hip strength and control (MMT at least 4+/5)  in LE to allow for proper functional mobility as indicated by patients Functional Deficits. 4. Patient will return to daily functional activities without increased symptoms or restriction. Progression Towards Functional goals:  [] Patient is progressing as expected towards functional goals listed. [] Progression is slowed due to complexities listed. [] Progression has been slowed due to co-morbidities.   [x] Plan

## 2019-04-23 ENCOUNTER — HOSPITAL ENCOUNTER (OUTPATIENT)
Dept: PHYSICAL THERAPY | Age: 54
Setting detail: THERAPIES SERIES
Discharge: HOME | End: 2019-04-23
Payer: COMMERCIAL

## 2019-04-23 PROCEDURE — 97112 NEUROMUSCULAR REEDUCATION: CPT | Performed by: PHYSICAL THERAPIST

## 2019-04-23 PROCEDURE — 97110 THERAPEUTIC EXERCISES: CPT | Performed by: PHYSICAL THERAPIST

## 2019-04-23 PROCEDURE — 97016 VASOPNEUMATIC DEVICE THERAPY: CPT | Performed by: PHYSICAL THERAPIST

## 2019-04-23 NOTE — FLOWSHEET NOTE
The [address] [place], [place] [address], [address], [address] [place] [address]  Phone: [phone]   Fax:     [phone]    Physical Therapy Daily Treatment Note  Date:  2019    Patient Name:  Kirsty Guajardo    :  1965  MRN: [id number]  Restrictions/Precautions:    Medical/Treatment Diagnosis Information:  · Diagnosis: S83.242D Left meniscus tear, M25.562 Left knee pain   · DOS 19  · Treatment Diagnosis: Left knee pain, effuson S05.265/077.81  Insurance/Certification information:  PT Insurance Information: [facility]   Physician Information:  Referring Practitioner: Dr. Shanell Davidson of care signed (Y/N):     Date of Patient follow up with Physician:     G-Code (if applicable):      Date G-Code Applied:  19       Progress Note: []  Yes  []  No  Next due by: Visit #10       Latex Allergy:  [x]NO      []YES  Preferred Language for Healthcare:   [x]English       []other:    Visit # Insurance Allowable   2 [facility]      Pain level:  3-4/10     SUBJECTIVE:  Pt reports the lateral portal site opened up a little and he did notice a some serosanguinous drainage      OBJECTIVE: See eval  Observation: entered using bilateral axillary crutches    Portal sites healing well with no signs of infection   Test measurements:      RESTRICTIONS/PRECAUTIONS:     Exercises/Interventions:   Exercise/Equipment Resistance/Repetitions Other comments   Stretching     Hamstring 5x30\"     Towel Pull     Inclined Calf 5x30\"  Added    Hip Flexion     ITB     Groin     Quad                    SLR     Supine 3x10 1# ^    Abduction 3x10 1# ^    Adduction 3x10 0#  ^    Prone     SLR+          Isometrics     Quad sets 10x10\"          Patellar Glides     Medial     Superior     Inferior          ROM     Hold on bending to allow lateral portal site to continue to heal    [facility] Weights     Passive     Active     Weight Shift     Ankle Pumps 30x CKC     Calf raises % WB on Biodex   3x10  Added 4/23   Wall sits     Step ups     1 leg stand     Squatting     CC TKE 3x10 blk Added 4/23   Balance     bridges          PRE     Extension  RANGE:   Flexion  RANGE:        Quantum machines     Leg press      Leg extension     Leg curl          Manual interventions                     Therapeutic Exercise and NMR EXR  [x] (70806) Provided verbal/tactile cueing for activities related to strengthening, flexibility, endurance, ROM for improvements in LE, proximal hip, and core control with self care, mobility, lifting, ambulation. [x] (53569) Provided verbal/tactile cueing for activities related to improving balance, coordination, kinesthetic sense, posture, motor skill, proprioception  to assist with LE, proximal hip, and core control in self care, mobility, lifting, ambulation and eccentric single leg control.      NMR and Therapeutic Activities:    [x] (19931 or 87247) Provided verbal/tactile cueing for activities related to improving balance, coordination, kinesthetic sense, posture, motor skill, proprioception and motor activation to allow for proper function of core, proximal hip and LE with self care and ADLs  [x] (00271) Gait Re-education- Provided training and instruction to the patient for proper LE, core and proximal hip recruitment and positioning and eccentric body weight control with ambulation re-education including up and down stairs     Home Exercise Program:    [x] (69713) Reviewed/Progressed HEP activities related to strengthening, flexibility, endurance, ROM of core, proximal hip and LE for functional self-care, mobility, lifting and ambulation/stair navigation   [] (38603)Reviewed/Progressed HEP activities related to improving balance, coordination, kinesthetic sense, posture, motor skill, proprioception of core, proximal hip and LE for self care, mobility, lifting, and ambulation/stair navigation      Manual Treatments:  PROM / STM / Oscillations-Mobs:  G-I, II, III, IV (PA's, Inf., Post.)  [] (08967) Provided manual therapy to mobilize LE, proximal hip and/or LS spine soft tissue/joints for the purpose of modulating pain, promoting relaxation,  increasing ROM, reducing/eliminating soft tissue swelling/inflammation/restriction, improving soft tissue extensibility and allowing for proper ROM for normal function with self care, mobility, lifting and ambulation. Modalities:  Vaso 15'     Charges:  Timed Code Treatment Minutes: 37   Total Treatment Minutes: 140-232     140-155 TE  156-216 NM   217-232 Vaso     [x] EVAL (LOW) 97292 (typically 20 minutes face-to-face)  [] EVAL (MOD) 09714 (typically 30 minutes face-to-face)  [] EVAL (HIGH) 35793 (typically 45 minutes face-to-face)  [] RE-EVAL   [x] AZ(06754) x  1   [] IONTO  [x] NMR (18038) x  1   [x] VASO  [] Manual (65569) x       [] Other:  [] TA x       [] Mech Traction (15426)  [] ES(attended) (90820)      [] ES (un) (14718):     GOALS:   Patient stated goal: Pt will return to work without limitations     Therapist goals for Patient:   Short Term Goals: To be achieved in: 2 weeks  1. Independent in HEP and progression per patient tolerance, in order to prevent re-injury. 2. Patient will have a decrease in pain to facilitate improvement in movement, function, and ADLs as indicated by Functional Deficits. Long Term Goals: To be achieved in: 6-8 weeks  1. Disability index score of 20% or less for the LEFS to assist with reaching prior level of function. 2. Patient will demonstrate increased AROM to WNL to allow for proper joint functioning as indicated by patients Functional Deficits. 3. Patient will demonstrate an increase in Strength to good proximal hip strength and control (MMT at least 4+/5)  in LE to allow for proper functional mobility as indicated by patients Functional Deficits. 4. Patient will return to daily functional activities without increased symptoms or restriction.

## 2019-04-25 ENCOUNTER — HOSPITAL ENCOUNTER (OUTPATIENT)
Dept: PHYSICAL THERAPY | Age: 54
Setting detail: THERAPIES SERIES
Discharge: HOME | End: 2019-04-25
Payer: COMMERCIAL

## 2019-04-25 PROCEDURE — 97110 THERAPEUTIC EXERCISES: CPT | Performed by: PHYSICAL THERAPIST

## 2019-04-25 PROCEDURE — 97112 NEUROMUSCULAR REEDUCATION: CPT | Performed by: PHYSICAL THERAPIST

## 2019-04-25 PROCEDURE — 97016 VASOPNEUMATIC DEVICE THERAPY: CPT | Performed by: PHYSICAL THERAPIST

## 2019-04-25 NOTE — FLOWSHEET NOTE
3x10  Added 4/23   Wall sits     Step ups     1 leg stand     Squatting     CC TKE 3x10 blk Added 4/23   Balance     bridges          PRE     Extension  RANGE:   Flexion  RANGE:        Quantum machines     Leg press  3x10 80# DL  Added 4/25 90-30   Leg extension     Leg curl          Manual interventions                     Therapeutic Exercise and NMR EXR  [x] (66419) Provided verbal/tactile cueing for activities related to strengthening, flexibility, endurance, ROM for improvements in LE, proximal hip, and core control with self care, mobility, lifting, ambulation. [x] (27050) Provided verbal/tactile cueing for activities related to improving balance, coordination, kinesthetic sense, posture, motor skill, proprioception  to assist with LE, proximal hip, and core control in self care, mobility, lifting, ambulation and eccentric single leg control.      NMR and Therapeutic Activities:    [x] (22317 or 70800) Provided verbal/tactile cueing for activities related to improving balance, coordination, kinesthetic sense, posture, motor skill, proprioception and motor activation to allow for proper function of core, proximal hip and LE with self care and ADLs  [x] (09726) Gait Re-education- Provided training and instruction to the patient for proper LE, core and proximal hip recruitment and positioning and eccentric body weight control with ambulation re-education including up and down stairs     Home Exercise Program:    [x] (19567) Reviewed/Progressed HEP activities related to strengthening, flexibility, endurance, ROM of core, proximal hip and LE for functional self-care, mobility, lifting and ambulation/stair navigation   [] (76834)Reviewed/Progressed HEP activities related to improving balance, coordination, kinesthetic sense, posture, motor skill, proprioception of core, proximal hip and LE for self care, mobility, lifting, and ambulation/stair navigation      Manual Treatments:  PROM / STM / Oscillations-Mobs: G-I, II, III, IV (PA's, Inf., Post.)  [] (11819) Provided manual therapy to mobilize LE, proximal hip and/or LS spine soft tissue/joints for the purpose of modulating pain, promoting relaxation,  increasing ROM, reducing/eliminating soft tissue swelling/inflammation/restriction, improving soft tissue extensibility and allowing for proper ROM for normal function with self care, mobility, lifting and ambulation. Modalities:  Vaso 15'     Charges:  Timed Code Treatment Minutes: 30   Total Treatment Minutes: 646-985     718-160 TE  920-940 NM   941-956 Vaso     [x] EVAL (LOW) 455 1011 (typically 20 minutes face-to-face)  [] EVAL (MOD) 33744 (typically 30 minutes face-to-face)  [] EVAL (HIGH) 24281 (typically 45 minutes face-to-face)  [] RE-EVAL   [x] BT(92827) x  1   [] IONTO  [x] NMR (17156) x  1   [x] VASO  [] Manual (63906) x       [] Other:  [] TA x       [] Mech Traction (05234)  [] ES(attended) (81462)      [] ES (un) (96751):     GOALS:   Patient stated goal: Pt will return to work without limitations     Therapist goals for Patient:   Short Term Goals: To be achieved in: 2 weeks  1. Independent in HEP and progression per patient tolerance, in order to prevent re-injury. 2. Patient will have a decrease in pain to facilitate improvement in movement, function, and ADLs as indicated by Functional Deficits. Long Term Goals: To be achieved in: 6-8 weeks  1. Disability index score of 20% or less for the LEFS to assist with reaching prior level of function. 2. Patient will demonstrate increased AROM to WNL to allow for proper joint functioning as indicated by patients Functional Deficits. 3. Patient will demonstrate an increase in Strength to good proximal hip strength and control (MMT at least 4+/5)  in LE to allow for proper functional mobility as indicated by patients Functional Deficits. 4. Patient will return to daily functional activities without increased symptoms or restriction.      Progression Towards Functional goals:  [] Patient is progressing as expected towards functional goals listed. [] Progression is slowed due to complexities listed. [] Progression has been slowed due to co-morbidities. [x] Plan just implemented, too soon to assess goals progression  [] Other:     ASSESSMENT:  See eval    Treatment/Activity Tolerance:  [x] Patient tolerated treatment well [] Patient limited by fatique  [] Patient limited by pain  [] Patient limited by other medical complications  [x] Other: no issues throughout session and he did well with progressions. Discussed with pt the importance of not overdoing it and joint protection for optimal outcomes. 4/25    Patient education:  4/19 Patient education on PT and plan of care including diagnosis, prognosis, treatment goals and options. Patient agrees with discussed POC and treatment and is aware of rehab process. Pt was also educated on clinic layout and use of modalities. Prognosis: [x] Good [] Fair  [] Poor    Patient Requires Follow-up: [x] Yes  [] No    PLAN: See eval  [x] Continue per plan of care [] Alter current plan (see comments)  [] Plan of care initiated [] Hold pending MD visit [] Discharge    Electronically signed by: Sang Millan PT, DPT    *If patient does not return for further follow ups after this date. Please consider this as the patients discharge from physical therapy.

## 2019-04-29 ENCOUNTER — HOSPITAL ENCOUNTER (OUTPATIENT)
Dept: PHYSICAL THERAPY | Age: 54
Setting detail: THERAPIES SERIES
Discharge: HOME | End: 2019-04-29
Payer: COMMERCIAL

## 2019-04-29 PROCEDURE — 97110 THERAPEUTIC EXERCISES: CPT | Performed by: PHYSICAL THERAPIST

## 2019-04-29 PROCEDURE — 97016 VASOPNEUMATIC DEVICE THERAPY: CPT | Performed by: PHYSICAL THERAPIST

## 2019-04-29 PROCEDURE — 97112 NEUROMUSCULAR REEDUCATION: CPT | Performed by: PHYSICAL THERAPIST

## 2019-04-29 NOTE — FLOWSHEET NOTE
The [facility] and [address] [facility], [address] [place] [address] 3360 [facility], [address] [place] [address]  Phone: [phone]   Fax:     [phone]    Physical Therapy Daily Treatment Note  Date:  2019    Patient Name:  America Moore    :  1965  MRN: [id number]  Restrictions/Precautions:    Medical/Treatment Diagnosis Information:  · Diagnosis: S83.242D Left meniscus tear, M25.562 Left knee pain   · DOS 19  · Treatment Diagnosis: Left knee pain, effuson H92.091/905.66  Insurance/Certification information:  PT Insurance Information: [facility]   Physician Information:  Referring Practitioner: Dr. Karin Xie of care signed (Y/N):     Date of Patient follow up with Physician:     G-Code (if applicable):      Date G-Code Applied:  19       Progress Note: []  Yes  []  No  Next due by: Visit #10       Latex Allergy:  [x]NO      []YES  Preferred Language for Healthcare:   [x]English       []other:    Visit # Insurance Allowable   4 [facility]      Pain level:  3/10     SUBJECTIVE:  No new issues     OBJECTIVE:   Observation: entered using unilateral axillary crutch    Portal sites healing well with no signs of infection   Test measurements:     117 deg flexion (sheet pulls)     RESTRICTIONS/PRECAUTIONS:     Exercises/Interventions:   Exercise/Equipment Resistance/Repetitions Other comments   Stretching     Hamstring 5x30\"     Towel Pull     Inclined Calf 5x30\"  Added    Hip Flexion     ITB     Groin     Quad                    SLR     Supine 3x10 3# ^    Abduction 3x10 3# ^    Adduction 3x10 3#  ^    Prone     SLR+ 3x30\"  Added         Isometrics     Quad sets 10x10\"          Patellar Glides     Medial     Superior     Inferior          ROM     Sheet Pulls 10x10\"    Hang Weights     Passive     Active     Weight Shift     Ankle Pumps 30x          Balance  Biodex PS L10 Added         CKC     Calf raises 3x10  Added    Wall sits Oscillations-Mobs:  G-I, II, III, IV (PA's, Inf., Post.)  [] (30489) Provided manual therapy to mobilize LE, proximal hip and/or LS spine soft tissue/joints for the purpose of modulating pain, promoting relaxation,  increasing ROM, reducing/eliminating soft tissue swelling/inflammation/restriction, improving soft tissue extensibility and allowing for proper ROM for normal function with self care, mobility, lifting and ambulation. Modalities:  Vaso 15'     Charges:  Timed Code Treatment Minutes: 45   Total Treatment Minutes: 9109-4992     8380-8229 2TE  8105-2365 NM   3067-2411 Vaso     [] EVAL (LOW) 04618 (typically 20 minutes face-to-face)  [] EVAL (MOD) 22177 (typically 30 minutes face-to-face)  [] EVAL (HIGH) 75981 (typically 45 minutes face-to-face)  [] RE-EVAL   [x] OH(78776) x  2   [] IONTO  [x] NMR (93856) x  1   [x] VASO  [] Manual (21123) x       [] Other:  [] TA x       [] Mech Traction (89924)  [] ES(attended) (80122)      [] ES (un) (77187):     GOALS:   Patient stated goal: Pt will return to work without limitations     Therapist goals for Patient:   Short Term Goals: To be achieved in: 2 weeks  1. Independent in HEP and progression per patient tolerance, in order to prevent re-injury. 2. Patient will have a decrease in pain to facilitate improvement in movement, function, and ADLs as indicated by Functional Deficits. Long Term Goals: To be achieved in: 6-8 weeks  1. Disability index score of 20% or less for the LEFS to assist with reaching prior level of function. 2. Patient will demonstrate increased AROM to WNL to allow for proper joint functioning as indicated by patients Functional Deficits. 3. Patient will demonstrate an increase in Strength to good proximal hip strength and control (MMT at least 4+/5)  in LE to allow for proper functional mobility as indicated by patients Functional Deficits.    4. Patient will return to daily functional activities without increased symptoms or restriction. Progression Towards Functional goals:  [] Patient is progressing as expected towards functional goals listed. [] Progression is slowed due to complexities listed. [] Progression has been slowed due to co-morbidities. [x] Plan just implemented, too soon to assess goals progression  [] Other:     ASSESSMENT:  See eval    Treatment/Activity Tolerance:  [x] Patient tolerated treatment well [] Patient limited by fatique  [] Patient limited by pain  [] Patient limited by other medical complications  [x] Other: Pt did well today without complaints - good gait pattern noted in clinic without AD. Gradually progress ROM and strengthening within MD guidelines. Reviewed the importance of joint protection and not overdoing it for optimal outcomes. 4/29     Patient education:  4/19 Patient education on PT and plan of care including diagnosis, prognosis, treatment goals and options. Patient agrees with discussed POC and treatment and is aware of rehab process. Pt was also educated on clinic layout and use of modalities. 4/29 may d/c crutches       Prognosis: [x] Good [] Fair  [] Poor    Patient Requires Follow-up: [x] Yes  [] No    PLAN: See eval  [x] Continue per plan of care [] Alter current plan (see comments)  [] Plan of care initiated [] Hold pending MD visit [] Discharge    Electronically signed by: Gabriela Reinoso PT, DPT    *If patient does not return for further follow ups after this date. Please consider this as the patients discharge from physical therapy.

## 2019-04-30 ENCOUNTER — TELEPHONE (OUTPATIENT)
Dept: ORTHOPEDIC SURGERY | Age: 54
End: 2019-04-30

## 2019-05-02 ENCOUNTER — HOSPITAL ENCOUNTER (OUTPATIENT)
Dept: PHYSICAL THERAPY | Age: 54
Setting detail: THERAPIES SERIES
Discharge: HOME | End: 2019-05-02
Payer: COMMERCIAL

## 2019-05-02 PROCEDURE — 97112 NEUROMUSCULAR REEDUCATION: CPT | Performed by: PHYSICAL THERAPIST

## 2019-05-02 PROCEDURE — 97110 THERAPEUTIC EXERCISES: CPT | Performed by: PHYSICAL THERAPIST

## 2019-05-02 PROCEDURE — 97016 VASOPNEUMATIC DEVICE THERAPY: CPT | Performed by: PHYSICAL THERAPIST

## 2019-05-02 NOTE — FLOWSHEET NOTE
[address] [facility], [facility] [employer] [address] [place] [address], [address] [place] [address]  Phone: [phone]   Fax:     [phone]    Physical Therapy Daily Treatment Note  Date:  2019    Patient Name:  Laith Montes    :  1965  MRN: [id number]  Restrictions/Precautions:    Medical/Treatment Diagnosis Information:  · Diagnosis: S83.242D Left meniscus tear, M25.562 Left knee pain   · DOS 19  · Treatment Diagnosis: Left knee pain, effuson I54.769/842.25  Insurance/Certification information:  PT Insurance Information: [facility]   Physician Information:  Referring Practitioner: Dr. Humphrey Vasquez of care signed (Y/N):     Date of Patient follow up with Physician:     G-Code (if applicable):      Date G-Code Applied:  19       Progress Note: []  Yes  []  No  Next due by: Visit #10       Latex Allergy:  [x]NO      []YES  Preferred Language for Healthcare:   [x]English       []other:    Visit # Insurance Allowable   5 [facility]      Pain level:  3/10     SUBJECTIVE:  No new issues     OBJECTIVE:   Observation: entered using unilateral axillary crutch    Portal sites healing well with no signs of infection   Test measurements:     117 deg flexion (sheet pulls)     RESTRICTIONS/PRECAUTIONS:     Exercises/Interventions:   Exercise/Equipment Resistance/Repetitions Other comments   Stretching     Hamstring 5x30\"     Towel Pull     Inclined Calf 5x30\"  Added    Hip Flexion     ITB     Groin     Quad                    SLR     Supine 3x10 3# ^    Abduction 3x10 3# ^    Adduction 3x10 3#  ^    Prone     SLR+ 3x30\"  Added         Isometrics     Quad sets 10x10\"          Patellar Glides     Medial     Superior     Inferior          ROM     Sheet Pulls 10x10\"    Hang Weights     Passive     Active     Weight Shift     Ankle Pumps 30x          Balance  Biodex PS L12 Added         CKC     Calf raises 3x10  Added    Wall sits NPV Step ups     1 leg stand NPV     Squatting     CC TKE 3x10 blk Added 4/23   Balance board  3x30\" 2 way  Added 4/29   bridges          PRE     Extension 3x10  RANGE:90-30 Added 5/2   Flexion 3x10 3#  RANGE: avail ^ 5/2        Quantum machines     Leg press  3x10 80# DL  Added 4/25 90-30   Leg extension     Leg curl          Bike  NPV                     Therapeutic Exercise and NMR EXR  [x] (63648) Provided verbal/tactile cueing for activities related to strengthening, flexibility, endurance, ROM for improvements in LE, proximal hip, and core control with self care, mobility, lifting, ambulation. [x] (50313) Provided verbal/tactile cueing for activities related to improving balance, coordination, kinesthetic sense, posture, motor skill, proprioception  to assist with LE, proximal hip, and core control in self care, mobility, lifting, ambulation and eccentric single leg control.      NMR and Therapeutic Activities:    [x] (65139 or 38121) Provided verbal/tactile cueing for activities related to improving balance, coordination, kinesthetic sense, posture, motor skill, proprioception and motor activation to allow for proper function of core, proximal hip and LE with self care and ADLs  [x] (34894) Gait Re-education- Provided training and instruction to the patient for proper LE, core and proximal hip recruitment and positioning and eccentric body weight control with ambulation re-education including up and down stairs     Home Exercise Program:    [x] (61895) Reviewed/Progressed HEP activities related to strengthening, flexibility, endurance, ROM of core, proximal hip and LE for functional self-care, mobility, lifting and ambulation/stair navigation   [] (56927)Reviewed/Progressed HEP activities related to improving balance, coordination, kinesthetic sense, posture, motor skill, proprioception of core, proximal hip and LE for self care, mobility, lifting, and ambulation/stair navigation      Manual Treatments:  PROM / STM / Oscillations-Mobs:  G-I, II, III, IV (PA's, Inf., Post.)  [] (62657) Provided manual therapy to mobilize LE, proximal hip and/or LS spine soft tissue/joints for the purpose of modulating pain, promoting relaxation,  increasing ROM, reducing/eliminating soft tissue swelling/inflammation/restriction, improving soft tissue extensibility and allowing for proper ROM for normal function with self care, mobility, lifting and ambulation. Modalities:  Vaso 15'     Charges:  Timed Code Treatment Minutes: 35   Total Treatment Minutes: 903-066     834-605 TE  317-503 [facility]     [] EVAL (LOW) 455 1011 (typically 20 minutes face-to-face)  [] EVAL (MOD) 75080 (typically 30 minutes face-to-face)  [] EVAL (HIGH) 40841 (typically 45 minutes face-to-face)  [] RE-EVAL   [x] OC(37299) x  1   [] IONTO  [x] NMR (51554) x  1   [x] VASO  [] Manual (37847) x       [] Other:  [] TA x       [] Mech Traction (62687)  [] ES(attended) (07705)      [] ES (un) (38153):     GOALS:   Patient stated goal: Pt will return to work without limitations     Therapist goals for Patient:   Short Term Goals: To be achieved in: 2 weeks  1. Independent in HEP and progression per patient tolerance, in order to prevent re-injury. 2. Patient will have a decrease in pain to facilitate improvement in movement, function, and ADLs as indicated by Functional Deficits. Long Term Goals: To be achieved in: 6-8 weeks  1. Disability index score of 20% or less for the LEFS to assist with reaching prior level of function. 2. Patient will demonstrate increased AROM to WNL to allow for proper joint functioning as indicated by patients Functional Deficits. 3. Patient will demonstrate an increase in Strength to good proximal hip strength and control (MMT at least 4+/5)  in LE to allow for proper functional mobility as indicated by patients Functional Deficits.    4. Patient will return to daily functional activities without increased symptoms or restriction. Progression Towards Functional goals:  [] Patient is progressing as expected towards functional goals listed. [] Progression is slowed due to complexities listed. [] Progression has been slowed due to co-morbidities. [x] Plan just implemented, too soon to assess goals progression  [] Other:     ASSESSMENT:  See eval    Treatment/Activity Tolerance:  [x] Patient tolerated treatment well [] Patient limited by fatique  [] Patient limited by pain  [] Patient limited by other medical complications  [x] Other:  Gradually progress ROM and strengthening within MD guidelines. Reviewed the importance of joint protection and not overdoing it for optimal outcomes. 5/2    Patient education:  4/19 Patient education on PT and plan of care including diagnosis, prognosis, treatment goals and options. Patient agrees with discussed POC and treatment and is aware of rehab process. Pt was also educated on clinic layout and use of modalities. 4/29 may d/c crutches       Prognosis: [x] Good [] Fair  [] Poor    Patient Requires Follow-up: [x] Yes  [] No    PLAN: See eval  [x] Continue per plan of care [] Alter current plan (see comments)  [] Plan of care initiated [] Hold pending MD visit [] Discharge    Electronically signed by: Vivian Loges PT, DPT    *If patient does not return for further follow ups after this date. Please consider this as the patients discharge from physical therapy.

## 2019-05-07 ENCOUNTER — HOSPITAL ENCOUNTER (OUTPATIENT)
Dept: PHYSICAL THERAPY | Age: 54
Setting detail: THERAPIES SERIES
Discharge: HOME | End: 2019-05-07
Payer: COMMERCIAL

## 2019-05-07 PROCEDURE — 97530 THERAPEUTIC ACTIVITIES: CPT | Performed by: PHYSICAL THERAPIST

## 2019-05-07 PROCEDURE — 97016 VASOPNEUMATIC DEVICE THERAPY: CPT | Performed by: PHYSICAL THERAPIST

## 2019-05-07 PROCEDURE — 97110 THERAPEUTIC EXERCISES: CPT | Performed by: PHYSICAL THERAPIST

## 2019-05-07 PROCEDURE — 97112 NEUROMUSCULAR REEDUCATION: CPT | Performed by: PHYSICAL THERAPIST

## 2019-05-07 NOTE — FLOWSHEET NOTE
[address] [facility], [facility] [address], [address] [place] [address]  Phone: [phone]   Fax:     [phone]    Physical Therapy Daily Treatment Note  Date:  2019    Patient Name:  Theresia Olszewski    :  1965  MRN: [id number]  Restrictions/Precautions:    Medical/Treatment Diagnosis Information:  · Diagnosis: S83.242D Left meniscus tear  · DOS 19  · Treatment Diagnosis: Left knee pain, effuson   Insurance/Certification information:  PT Insurance Information: [facility]   Physician Information:  Referring Practitioner: Dr. Maura Faye of care signed (Y/N):     Date of Patient follow up with Physician:     G-Code (if applicable):      Date G-Code Applied:  19       Progress Note: []  Yes  []  No  Next due by: Visit #10       Latex Allergy:  [x]NO      []YES  Preferred Language for Healthcare:   [x]English       []other:    Visit # Insurance Allowable   6   BWC (2-3x for 4-6 weeks)     Pain level:  2-3/10     SUBJECTIVE:  No new issues     OBJECTIVE:   Observation: entered using unilateral axillary crutch    Portal sites healing well with no signs of infection   Test measurements:     117 deg flexion (sheet pulls)     RESTRICTIONS/PRECAUTIONS:     Exercises/Interventions:   Exercise/Equipment Resistance/Repetitions Other comments   Stretching     Hamstring 5x30\"     Towel Pull     Inclined Calf 5x30\"  Added    Hip Flexion     ITB     Groin     Quad                    SLR     Supine 3x10 3# ^    Abduction 3x10 3# ^    Adduction 3x10 3#  ^    Prone     SLR+ 5x30\"  Added         Isometrics     Quad sets 10x10\"          Patellar Glides     Medial     Superior     Inferior          ROM     Sheet Pulls 10x10\"    Hang Weights     Passive     Active     Weight Shift     Ankle Pumps          Balance  Biodex PS L12 Added         CKC     Calf raises 3x10  Added    Wall sits 5x20\"  Added  Step ups NPV     1 leg stand 5x30\"  Added 5/7   Squatting     CC TKE 3x10 CC5 ^ 5/7   Balance board  bridges          PRE     Extension 3x10 5#  RANGE:90-30 ^ 5/7   Flexion 3x10 5# RANGE: avail ^ 5/7        Quantum machines     Leg press  3x10 100# DL  ^5/7 range: 90-30   Leg extension NPV    Leg curl NPV         Bike  5'  Added 5/7                   Therapeutic Exercise and NMR EXR  [x] (16374) Provided verbal/tactile cueing for activities related to strengthening, flexibility, endurance, ROM for improvements in LE, proximal hip, and core control with self care, mobility, lifting, ambulation. [x] (77887) Provided verbal/tactile cueing for activities related to improving balance, coordination, kinesthetic sense, posture, motor skill, proprioception  to assist with LE, proximal hip, and core control in self care, mobility, lifting, ambulation and eccentric single leg control.      NMR and Therapeutic Activities:    [x] (09353 or 80875) Provided verbal/tactile cueing for activities related to improving balance, coordination, kinesthetic sense, posture, motor skill, proprioception and motor activation to allow for proper function of core, proximal hip and LE with self care and ADLs  [x] (64727) Gait Re-education- Provided training and instruction to the patient for proper LE, core and proximal hip recruitment and positioning and eccentric body weight control with ambulation re-education including up and down stairs     Home Exercise Program:    [x] (61119) Reviewed/Progressed HEP activities related to strengthening, flexibility, endurance, ROM of core, proximal hip and LE for functional self-care, mobility, lifting and ambulation/stair navigation   [] (29405)Reviewed/Progressed HEP activities related to improving balance, coordination, kinesthetic sense, posture, motor skill, proprioception of core, proximal hip and LE for self care, mobility, lifting, and ambulation/stair navigation      Manual Treatments:  PROM / STM / Oscillations-Mobs:  G-I, II, III, IV (PA's, Inf., Post.)  [] (87353) Provided manual therapy to mobilize LE, proximal hip and/or LS spine soft tissue/joints for the purpose of modulating pain, promoting relaxation,  increasing ROM, reducing/eliminating soft tissue swelling/inflammation/restriction, improving soft tissue extensibility and allowing for proper ROM for normal function with self care, mobility, lifting and ambulation. Modalities:  Vaso 15'     Charges:  Timed Code Treatment Minutes: 50   Total Treatment Minutes: 672-455     519-698 TE  835-850 TA  521-446 NM    847-871 Vaso     [] EVAL (LOW) 08260 (typically 20 minutes face-to-face)  [] EVAL (MOD) 45757 (typically 30 minutes face-to-face)  [] EVAL (HIGH) 08104 (typically 45 minutes face-to-face)  [] RE-EVAL   [x] FV(35978) x  1   [] IONTO  [x] NMR (00944) x  1   [x] VASO  [] Manual (16720) x       [] Other:  [x] TA x  1    [] Mech Traction (41766)  [] ES(attended) (07197)      [] ES (un) (16103):     GOALS:   Patient stated goal: Pt will return to work without limitations     Therapist goals for Patient:   Short Term Goals: To be achieved in: 2 weeks  1. Independent in HEP and progression per patient tolerance, in order to prevent re-injury. 2. Patient will have a decrease in pain to facilitate improvement in movement, function, and ADLs as indicated by Functional Deficits. Long Term Goals: To be achieved in: 6-8 weeks  1. Disability index score of 20% or less for the LEFS to assist with reaching prior level of function. 2. Patient will demonstrate increased AROM to WNL to allow for proper joint functioning as indicated by patients Functional Deficits. 3. Patient will demonstrate an increase in Strength to good proximal hip strength and control (MMT at least 4+/5)  in LE to allow for proper functional mobility as indicated by patients Functional Deficits.    4. Patient will return to daily functional activities without increased symptoms or restriction. Progression Towards Functional goals:  [] Patient is progressing as expected towards functional goals listed. [] Progression is slowed due to complexities listed. [] Progression has been slowed due to co-morbidities. [x] Plan just implemented, too soon to assess goals progression  [] Other:     ASSESSMENT:  See eval    Treatment/Activity Tolerance:  [x] Patient tolerated treatment well [] Patient limited by fatique  [] Patient limited by pain  [] Patient limited by other medical complications  [x] Other: Did well with progressions without issues. Gradually monitor and progress as tolerated in order to improve function and decrease symptoms. 5/7        Patient education:  4/19 Patient education on PT and plan of care including diagnosis, prognosis, treatment goals and options. Patient agrees with discussed POC and treatment and is aware of rehab process. Pt was also educated on clinic layout and use of modalities. 4/29 may d/c crutches       Prognosis: [x] Good [] Fair  [] Poor    Patient Requires Follow-up: [x] Yes  [] No    PLAN: See eval  [x] Continue per plan of care [] Alter current plan (see comments)  [] Plan of care initiated [] Hold pending MD visit [] Discharge    Electronically signed by: Jailene Ruiz PT, DPT    *If patient does not return for further follow ups after this date. Please consider this as the patients discharge from physical therapy.

## 2019-05-09 ENCOUNTER — HOSPITAL ENCOUNTER (OUTPATIENT)
Dept: PHYSICAL THERAPY | Age: 54
Setting detail: THERAPIES SERIES
Discharge: HOME | End: 2019-05-09
Payer: COMMERCIAL

## 2019-05-09 PROCEDURE — 97530 THERAPEUTIC ACTIVITIES: CPT | Performed by: PHYSICAL THERAPIST

## 2019-05-09 PROCEDURE — 97112 NEUROMUSCULAR REEDUCATION: CPT | Performed by: PHYSICAL THERAPIST

## 2019-05-09 PROCEDURE — 97016 VASOPNEUMATIC DEVICE THERAPY: CPT | Performed by: PHYSICAL THERAPIST

## 2019-05-09 PROCEDURE — 97110 THERAPEUTIC EXERCISES: CPT | Performed by: PHYSICAL THERAPIST

## 2019-05-09 NOTE — FLOWSHEET NOTE
[address] [facility], [facility] [address], [address] [place] [address]  Phone: [phone]   Fax:     [phone]    Physical Therapy Daily Treatment Note  Date:  2019    Patient Name:  Leonor Merchant    :  1965  MRN: [id number]  Restrictions/Precautions:    Medical/Treatment Diagnosis Information:  · Diagnosis: S83.242D Left meniscus tear  · DOS 19  · Treatment Diagnosis: Left knee pain, effuson   Insurance/Certification information:  PT Insurance Information: [facility]   Physician Information:  Referring Practitioner: Dr. Henok Avendano of care signed (Y/N):     Date of Patient follow up with Physician:     G-Code (if applicable):      Date G-Code Applied:  19       Progress Note: []  Yes  []  No  Next due by: Visit #10       Latex Allergy:  [x]NO      []YES  Preferred Language for Healthcare:   [x]English       []other:    Visit # Insurance Allowable   7   BWC (2-3x for 4-6 weeks)     Pain level:  2-3/10     SUBJECTIVE:  No new issues     OBJECTIVE:   Observation: normal gait    Portal sites healing well with no signs of infection   Test measurements:     117 deg flexion (sheet pulls)     RESTRICTIONS/PRECAUTIONS:     Exercises/Interventions:   Exercise/Equipment Resistance/Repetitions Other comments   Stretching     Hamstring 5x30\"     Towel Pull     Inclined Calf 5x30\"  Added    Hip Flexion     ITB     Groin     Quad 3x30\" standing w/ foot on stool  Added                   SLR     Supine 3x10 3# ^    Abduction 3x10 3# ^    Adduction 3x10 3#  ^    Prone     SLR+ 5x30\"  Added         Isometrics     Quad sets 10x10\"          Patellar Glides     Medial     Superior     Inferior          ROM     Sheet Pulls 10x10\"    Hang Weights     Passive     Active     Weight Shift     Ankle Pumps          Balance  Biodex PS L12 Added         CKC     Calf raises 3x10  Added    Wall sits 5x30\"  ^ 5/9   Step ups L2 3x10   Added 5/9   1 leg stand 5x30\"  Added 5/7   Squatting     CC TKE     Balance board     bridges          PRE          Quantum machines     Leg press  3x10 125# DL  ^5/9 range: 90-30   Leg extension 3x10 15# SL  Added 5/9   Leg curl 3x10 30# SL  Added 5/9        Bike  5'  Added 5/7                   Therapeutic Exercise and NMR EXR  [x] (98021) Provided verbal/tactile cueing for activities related to strengthening, flexibility, endurance, ROM for improvements in LE, proximal hip, and core control with self care, mobility, lifting, ambulation. [x] (26939) Provided verbal/tactile cueing for activities related to improving balance, coordination, kinesthetic sense, posture, motor skill, proprioception  to assist with LE, proximal hip, and core control in self care, mobility, lifting, ambulation and eccentric single leg control.      NMR and Therapeutic Activities:    [x] (86153 or 45822) Provided verbal/tactile cueing for activities related to improving balance, coordination, kinesthetic sense, posture, motor skill, proprioception and motor activation to allow for proper function of core, proximal hip and LE with self care and ADLs  [x] (93000) Gait Re-education- Provided training and instruction to the patient for proper LE, core and proximal hip recruitment and positioning and eccentric body weight control with ambulation re-education including up and down stairs     Home Exercise Program:    [x] (82790) Reviewed/Progressed HEP activities related to strengthening, flexibility, endurance, ROM of core, proximal hip and LE for functional self-care, mobility, lifting and ambulation/stair navigation   [] (76051)Reviewed/Progressed HEP activities related to improving balance, coordination, kinesthetic sense, posture, motor skill, proprioception of core, proximal hip and LE for self care, mobility, lifting, and ambulation/stair navigation      Manual Treatments:  PROM / STM / Oscillations-Mobs:  NEENA Progression Towards Functional goals:  [] Patient is progressing as expected towards functional goals listed. [] Progression is slowed due to complexities listed. [] Progression has been slowed due to co-morbidities. [x] Plan just implemented, too soon to assess goals progression  [] Other:     ASSESSMENT:  See eval    Treatment/Activity Tolerance:  [x] Patient tolerated treatment well [] Patient limited by fatique  [] Patient limited by pain  [] Patient limited by other medical complications  [x] Other: Monitor and progress as tolerated in order to improve function and decrease symptoms. 5/9       Patient education:  4/19 Patient education on PT and plan of care including diagnosis, prognosis, treatment goals and options. Patient agrees with discussed POC and treatment and is aware of rehab process. Pt was also educated on clinic layout and use of modalities. 4/29 may d/c crutches       Prognosis: [x] Good [] Fair  [] Poor    Patient Requires Follow-up: [x] Yes  [] No    PLAN: See eval  [x] Continue per plan of care [] Alter current plan (see comments)  [] Plan of care initiated [] Hold pending MD visit [] Discharge    Electronically signed by: Herve John PT, DPT    *If patient does not return for further follow ups after this date. Please consider this as the patients discharge from physical therapy.

## 2019-05-14 ENCOUNTER — HOSPITAL ENCOUNTER (OUTPATIENT)
Dept: PHYSICAL THERAPY | Age: 54
Setting detail: THERAPIES SERIES
Discharge: HOME | End: 2019-05-14
Payer: COMMERCIAL

## 2019-05-14 PROCEDURE — 97530 THERAPEUTIC ACTIVITIES: CPT | Performed by: PHYSICAL THERAPIST

## 2019-05-14 PROCEDURE — 97112 NEUROMUSCULAR REEDUCATION: CPT | Performed by: PHYSICAL THERAPIST

## 2019-05-14 PROCEDURE — 97110 THERAPEUTIC EXERCISES: CPT | Performed by: PHYSICAL THERAPIST

## 2019-05-14 PROCEDURE — 97016 VASOPNEUMATIC DEVICE THERAPY: CPT | Performed by: PHYSICAL THERAPIST

## 2019-05-14 NOTE — FLOWSHEET NOTE
[address] [facility], [facility] [address], [address] [place] [address]  Phone: [phone]   Fax:     [phone]    Physical Therapy Daily Treatment Note  Date:  2019    Patient Name:  Kendra Avelar    :  1965  MRN: [id number]  Restrictions/Precautions:    Medical/Treatment Diagnosis Information:  · Diagnosis: S83.242D Left meniscus tear  · DOS 19  · Treatment Diagnosis: Left knee pain, effuson   Insurance/Certification information:  PT Insurance Information: [facility]   Physician Information:  Referring Practitioner: Dr. Kentrell Fox of care signed (Y/N):     Date of Patient follow up with Physician:     G-Code (if applicable):      Date G-Code Applied:  19       Progress Note: []  Yes  []  No  Next due by: Visit #10       Latex Allergy:  [x]NO      []YES  Preferred Language for Healthcare:   [x]English       []other:    Visit # Insurance Allowable   8   BWC (2-3x for 4-6 weeks)     Pain level:  2-3/10     SUBJECTIVE:  Pt reports he did a lot of walking on Saturday and had increased swelling     OBJECTIVE:   Observation: normal gait     Portal sites healing well with no signs of infection   Test measurements:     130 deg flexion (sheet pulls)     RESTRICTIONS/PRECAUTIONS:     Exercises/Interventions:   Exercise/Equipment Resistance/Repetitions Other comments   Stretching     Hamstring 5x30\"     Towel Pull     Inclined Calf 5x30\"  Added    Hip Flexion     ITB     Groin     Quad 3x30\" standing w/ foot on stool  Added                   SLR     Supine 3x10 4# ^    Abduction 3x10 4# ^    Adduction 3x10 4#  ^    Prone     SLR+ 5x30\"  Added         Isometrics     Quad sets 10x10\"          Patellar Glides     Medial     Superior     Inferior          ROM     Sheet Pulls 10x10\"    Hang Weights     Passive     Active     Weight Shift     Ankle Pumps     Balance  Biodex Maze L12 ^  CKC     Calf raises 3x10  Added 4/23   Wall sits 5x30\"  ^ 5/9   Step ups L3 3x10   ^ 5/14   1 leg stand 5x30\"  Added 5/7   Squatting     CC TKE     Balance board     bridges          PRE          Quantum machines     Leg press  3x10 125# DL  ^5/9 range: 90-30   Leg extension 3x10 20# SL  ^ 5/14   Leg curl 3x10 35# SL  ^ 5/14        Bike  5'  Added 5/7                   Therapeutic Exercise and NMR EXR  [x] (89602) Provided verbal/tactile cueing for activities related to strengthening, flexibility, endurance, ROM for improvements in LE, proximal hip, and core control with self care, mobility, lifting, ambulation. [x] (39935) Provided verbal/tactile cueing for activities related to improving balance, coordination, kinesthetic sense, posture, motor skill, proprioception  to assist with LE, proximal hip, and core control in self care, mobility, lifting, ambulation and eccentric single leg control.      NMR and Therapeutic Activities:    [x] (66861 or 38512) Provided verbal/tactile cueing for activities related to improving balance, coordination, kinesthetic sense, posture, motor skill, proprioception and motor activation to allow for proper function of core, proximal hip and LE with self care and ADLs  [x] (39099) Gait Re-education- Provided training and instruction to the patient for proper LE, core and proximal hip recruitment and positioning and eccentric body weight control with ambulation re-education including up and down stairs     Home Exercise Program:    [x] (71887) Reviewed/Progressed HEP activities related to strengthening, flexibility, endurance, ROM of core, proximal hip and LE for functional self-care, mobility, lifting and ambulation/stair navigation   [] (26122)Reviewed/Progressed HEP activities related to improving balance, coordination, kinesthetic sense, posture, motor skill, proprioception of core, proximal hip and LE for self care, mobility, lifting, and ambulation/stair navigation      Manual Treatments:  PROM / STM / Oscillations-Mobs:  G-I, II, III, IV (PA's, Inf., Post.)  [] (68047) Provided manual therapy to mobilize LE, proximal hip and/or LS spine soft tissue/joints for the purpose of modulating pain, promoting relaxation,  increasing ROM, reducing/eliminating soft tissue swelling/inflammation/restriction, improving soft tissue extensibility and allowing for proper ROM for normal function with self care, mobility, lifting and ambulation. Modalities:  Vaso 15'     Charges:  Timed Code Treatment Minutes: 55   Total Treatment Minutes: 463-1057     -715  -1005   NM  0982-1369   Vaso 2026-8515    [] EVAL (LOW) 83907 (typically 20 minutes face-to-face)  [] EVAL (MOD) 87375 (typically 30 minutes face-to-face)  [] EVAL (HIGH) 53717 (typically 45 minutes face-to-face)  [] RE-EVAL   [x] WC(40988) x  2   [] IONTO  [x] NMR (92787) x  1   [x] VASO  [] Manual (18155) x       [] Other:  [x] TA x  1    [] Mech Traction (07206)  [] ES(attended) (21164)      [] ES (un) (95426):     GOALS:   Patient stated goal: Pt will return to work without limitations     Therapist goals for Patient:   Short Term Goals: To be achieved in: 2 weeks  1. Independent in HEP and progression per patient tolerance, in order to prevent re-injury. 2. Patient will have a decrease in pain to facilitate improvement in movement, function, and ADLs as indicated by Functional Deficits. Long Term Goals: To be achieved in: 6-8 weeks  1. Disability index score of 20% or less for the LEFS to assist with reaching prior level of function. 2. Patient will demonstrate increased AROM to WNL to allow for proper joint functioning as indicated by patients Functional Deficits. 3. Patient will demonstrate an increase in Strength to good proximal hip strength and control (MMT at least 4+/5)  in LE to allow for proper functional mobility as indicated by patients Functional Deficits.    4. Patient will return to daily functional activities without increased symptoms or restriction. Progression Towards Functional goals:  [] Patient is progressing as expected towards functional goals listed. [] Progression is slowed due to complexities listed. [] Progression has been slowed due to co-morbidities. [x] Plan just implemented, too soon to assess goals progression  [] Other:     ASSESSMENT:  See eval    Treatment/Activity Tolerance:  [x] Patient tolerated treatment well [] Patient limited by fatique  [] Patient limited by pain  [] Patient limited by other medical complications  [x] Other: Pt did well today without any increase in symptoms. Discussed soreness 4 weeks post op is normal and pain and swelling are his guides if he is overdoing it. Continue to advance resistance as able. 5/14      Patient education:  4/19 Patient education on PT and plan of care including diagnosis, prognosis, treatment goals and options. Patient agrees with discussed POC and treatment and is aware of rehab process. Pt was also educated on clinic layout and use of modalities. 4/29 may d/c crutches       Prognosis: [x] Good [] Fair  [] Poor    Patient Requires Follow-up: [x] Yes  [] No    PLAN: See eval  [x] Continue per plan of care [] Alter current plan (see comments)  [] Plan of care initiated [] Hold pending MD visit [] Discharge    Electronically signed by: Shanel Lyon PT, DPT    *If patient does not return for further follow ups after this date. Please consider this as the patients discharge from physical therapy.

## 2019-05-17 ENCOUNTER — OFFICE VISIT (OUTPATIENT)
Dept: ORTHOPEDIC SURGERY | Age: 54
End: 2019-05-17
Payer: COMMERCIAL

## 2019-05-17 ENCOUNTER — HOSPITAL ENCOUNTER (OUTPATIENT)
Dept: PHYSICAL THERAPY | Age: 54
Setting detail: THERAPIES SERIES
Discharge: HOME | End: 2019-05-17
Payer: COMMERCIAL

## 2019-05-17 VITALS
HEART RATE: 87 BPM | HEIGHT: 74 IN | SYSTOLIC BLOOD PRESSURE: 126 MMHG | DIASTOLIC BLOOD PRESSURE: 83 MMHG | BODY MASS INDEX: 28.23 KG/M2 | WEIGHT: 220 LBS

## 2019-05-17 DIAGNOSIS — M17.0 BILATERAL PRIMARY OSTEOARTHRITIS OF KNEE: Primary | ICD-10-CM

## 2019-05-17 PROCEDURE — 97112 NEUROMUSCULAR REEDUCATION: CPT | Performed by: PHYSICAL THERAPIST

## 2019-05-17 PROCEDURE — 97530 THERAPEUTIC ACTIVITIES: CPT | Performed by: PHYSICAL THERAPIST

## 2019-05-17 PROCEDURE — 99024 POSTOP FOLLOW-UP VISIT: CPT | Performed by: ORTHOPAEDIC SURGERY

## 2019-05-17 PROCEDURE — MISC265 BAUERFEIND GENUTRAIN KNEE SLEEVE: Performed by: ORTHOPAEDIC SURGERY

## 2019-05-17 PROCEDURE — 97110 THERAPEUTIC EXERCISES: CPT | Performed by: PHYSICAL THERAPIST

## 2019-05-17 PROCEDURE — 97016 VASOPNEUMATIC DEVICE THERAPY: CPT | Performed by: PHYSICAL THERAPIST

## 2019-05-17 NOTE — FLOWSHEET NOTE
[facility], [address] [place] [address], [address] [place] [address]  Phone: [phone]   Fax:     [phone]    Physical Therapy Daily Treatment Note  Date:  2019    Patient Name:  Mae Flower    :  1965  MRN: [id number]  Restrictions/Precautions:    Medical/Treatment Diagnosis Information:  · Diagnosis: S83.242D Left meniscus tear  · DOS 19  · Treatment Diagnosis: Left knee pain, effuson   Insurance/Certification information:  PT Insurance Information: [facility]   Physician Information:  Referring Practitioner: Dr. Hemalatha Lamas of care signed (Y/N):     Date of Patient follow up with Physician:     G-Code (if applicable):      Date G-Code Applied:  19       Progress Note: []  Yes  []  No  Next due by: Visit #10       Latex Allergy:  [x]NO      []YES  Preferred Language for Healthcare:   [x]English       []other:    Visit # Insurance Allowable   9   [facility] (2-3x for 4-6 weeks)     Pain level:  2-3/10     SUBJECTIVE:  Pt went to a concert last night and his knee did well. He took it easy earlier in the day since he knew he would be on his feet.       OBJECTIVE:   Observation: normal gait     Portal sites healing well with no signs of infection   Test measurements:     129 deg flexion (sheet pulls)     RESTRICTIONS/PRECAUTIONS:     Exercises/Interventions:   Exercise/Equipment Resistance/Repetitions Other comments   Stretching     Hamstring 5x30\"     Towel Pull     Inclined Calf 5x30\"  Added    Hip Flexion     ITB     Groin     Quad 3x30\" standing w/ foot on stool  Added                   SLR     Supine 3x10 4# ^ 5/14   Abduction 3x10 4# ^ 5/14   Adduction 3x10 4#  ^ 5/14   Prone     SLR+ 5x30\"  Added         Isometrics     Quad sets 10x10\"          Patellar Glides     Medial     Superior     Inferior          ROM     Sheet Pulls 10x10\"    Hang Weights     Passive     Active     Weight Shift Ankle Pumps     Balance  Biodex Maze L12 ^ 5/14        CKC     Calf raises 3x10  Added 4/23   Wall sits 5x30\"  ^ 5/9   Step ups L3 3x10   ^ 5/14   1 leg stand 5x30\" Aero  ^ 5/17   Squatting     CC TKE     Balance board     bridges          PRE          Quantum machines     Leg press  3x10 125# DL  ^5/9 range: 90-30   Leg extension 3x10 25# SL  ^ 5/17   Leg curl 3x10 40# SL  ^ 5/17        Bike  5'  Added 5/7                   Therapeutic Exercise and NMR EXR  [x] (46188) Provided verbal/tactile cueing for activities related to strengthening, flexibility, endurance, ROM for improvements in LE, proximal hip, and core control with self care, mobility, lifting, ambulation. [x] (74925) Provided verbal/tactile cueing for activities related to improving balance, coordination, kinesthetic sense, posture, motor skill, proprioception  to assist with LE, proximal hip, and core control in self care, mobility, lifting, ambulation and eccentric single leg control.      NMR and Therapeutic Activities:    [x] (03055 or 82261) Provided verbal/tactile cueing for activities related to improving balance, coordination, kinesthetic sense, posture, motor skill, proprioception and motor activation to allow for proper function of core, proximal hip and LE with self care and ADLs  [x] (84794) Gait Re-education- Provided training and instruction to the patient for proper LE, core and proximal hip recruitment and positioning and eccentric body weight control with ambulation re-education including up and down stairs     Home Exercise Program:    [x] (16413) Reviewed/Progressed HEP activities related to strengthening, flexibility, endurance, ROM of core, proximal hip and LE for functional self-care, mobility, lifting and ambulation/stair navigation   [] (96969)Reviewed/Progressed HEP activities related to improving balance, coordination, kinesthetic sense, posture, motor skill, proprioception of core, proximal hip and LE for self care, mobility, lifting, and ambulation/stair navigation      Manual Treatments:  PROM / STM / Oscillations-Mobs:  G-I, II, III, IV (PA's, Inf., Post.)  [] (46544) Provided manual therapy to mobilize LE, proximal hip and/or LS spine soft tissue/joints for the purpose of modulating pain, promoting relaxation,  increasing ROM, reducing/eliminating soft tissue swelling/inflammation/restriction, improving soft tissue extensibility and allowing for proper ROM for normal function with self care, mobility, lifting and ambulation. Modalities:  Vaso 15'     Charges:  Timed Code Treatment Minutes: 50   Total Treatment Minutes: 1782-1690     TE 8764-4777  TA 5716-2478   NM  7927-0395   Vaso 2407-3384    [] EVAL (LOW) 26710 (typically 20 minutes face-to-face)  [] EVAL (MOD) 25896 (typically 30 minutes face-to-face)  [] EVAL (HIGH) 37810 (typically 45 minutes face-to-face)  [] RE-EVAL   [x] DL(57125) x  1   [] IONTO  [x] NMR (26959) x  1   [x] VASO  [] Manual (58471) x       [] Other:  [x] TA x  1    [] Mech Traction (90458)  [] ES(attended) (68619)      [] ES (un) (83509):     GOALS:   Patient stated goal: Pt will return to work without limitations     Therapist goals for Patient:   Short Term Goals: To be achieved in: 2 weeks  1. Independent in HEP and progression per patient tolerance, in order to prevent re-injury. 2. Patient will have a decrease in pain to facilitate improvement in movement, function, and ADLs as indicated by Functional Deficits. Long Term Goals: To be achieved in: 6-8 weeks  1. Disability index score of 20% or less for the LEFS to assist with reaching prior level of function. 2. Patient will demonstrate increased AROM to WNL to allow for proper joint functioning as indicated by patients Functional Deficits.    3. Patient will demonstrate an increase in Strength to good proximal hip strength and control (MMT at least 4+/5)  in LE to allow for proper functional mobility as indicated by patients

## 2019-05-17 NOTE — PROGRESS NOTES
History of Present Illness:  Garrick Morin is a pleasant 46 y.o. male 1 month s/p L knee partial meniscectomy, chondroplasty and synovectomy. This is a Workmen's Comp. case. Patient reports that he requires a lot of walking as well as going up and down steps frequently as part of his job. He feels very well today however has been doing considerably well with his physical therapy. Continues to make gains a particularly with his quad strength. He would like to have a note in order to go back to light duty at work. He otherwise endorses no new issues. Medical History:  Patient's medications, allergies, past medical, surgical, social and family histories were reviewed and updated as appropriate. Pertinent items are noted in HPI  Review of systems reviewed from Patient History Form is available in the patient's chart under the Media tab. Vital Signs:  Vitals:    05/17/19 1133   BP: 126/83   Pulse: 87         Neuro: Alert & oriented x 3,  normal,  no focal deficits noted. Normal affect. Eyes: sclera clear  Ears: Normal external ear  Mouth:  No perioral lesions  Pulm: Respirations unlabored and regular  Pulse: Regular rate and rhythm   Skin: Warm, well perfused      Constitutional: In no apparent distress. Normal affect. Alert and oriented X3 and is cooperative. LEft Knee Examination:    Gait: No use of assistive devices. No antalgic gait. Alignment: varus Alignment appreciated. Inspection/skin: Quadriceps well developed though with some atrophy. Skin is intact without erythema or ecchymosis. No gross deformity. Healed incisions with some residual eschar    Palpation: PF crepitus. Nontender along joint line. No pain with compression of patella. Nontender to light touch. Range of Motion: Full ROM. Strength: 4+/5 quad strength    Effusion: small apparent effusion. Ligamentous stability: Stable to valgus and varus stress at 0° and 30°. Solid endpoint with Lachman's.  Negative DISPLAY PLAN FREE TEXT

## 2019-05-17 NOTE — LETTER
[address] [address]  Phone: [phone]  Fax: [phone]    Nahum Jackson MD        May 17, 2019     Patient: Licha Patel   YOB: 1965   Date of Visit: 5/17/2019       To Whom It May Concern: It is my medical opinion that Sylvia Calderon may return to work on 05/20/2019 with the following restrictions: lifting/carrying not to exceed 10 lbs. , bending/stooping not to exceed 4 x per hour, avoid excessive walking or standing, and limited stairs . If you have any questions or concerns, please don't hesitate to call.     Sincerely,        Nahum Jackson MD

## 2019-05-21 ENCOUNTER — HOSPITAL ENCOUNTER (OUTPATIENT)
Dept: PHYSICAL THERAPY | Age: 54
Setting detail: THERAPIES SERIES
Discharge: HOME | End: 2019-05-21
Payer: COMMERCIAL

## 2019-05-21 PROCEDURE — 97112 NEUROMUSCULAR REEDUCATION: CPT | Performed by: PHYSICAL THERAPIST

## 2019-05-21 PROCEDURE — 97110 THERAPEUTIC EXERCISES: CPT | Performed by: PHYSICAL THERAPIST

## 2019-05-21 PROCEDURE — 97530 THERAPEUTIC ACTIVITIES: CPT | Performed by: PHYSICAL THERAPIST

## 2019-05-21 PROCEDURE — 97016 VASOPNEUMATIC DEVICE THERAPY: CPT | Performed by: PHYSICAL THERAPIST

## 2019-05-21 NOTE — FLOWSHEET NOTE
[address], [address] [place] [address] [address] , [address] [place] [address]  Phone: [phone]   Fax:     [phone]    Physical Therapy Daily Treatment Note  Date:  2019    Patient Name:  Regan Otero    :  1965  MRN: [id number]  Restrictions/Precautions:    Medical/Treatment Diagnosis Information:  · Diagnosis: S83.242D Left meniscus tear  · DOS 19  · Treatment Diagnosis: Left knee pain, effuson   Insurance/Certification information:  PT Insurance Information: [facility]   Physician Information:  Referring Practitioner: Dr. Rudy Garza of care signed (Y/N):     Date of Patient follow up with Physician:     G-Code (if applicable):      Date G-Code Applied:     LEFS - 25% deficit     Progress Note: []  Yes  []  No  Next due by: Visit #10  PROGRESS NOTE NPV     Latex Allergy:  [x]NO      []YES  Preferred Language for Healthcare:   [x]English       []other:    Visit # Insurance Allowable   10   [facility] (2-3x for 4-6 weeks)     Pain level:  2-310     SUBJECTIVE:  Pt went to a concert last night and his knee did well. He took it easy earlier in the day since he knew he would be on his feet.       OBJECTIVE:   Observation: normal gait     Portal sites healing well with no signs of infection   Test measurements:     129 deg flexion (sheet pulls)     RESTRICTIONS/PRECAUTIONS:     Exercises/Interventions:   Exercise/Equipment Resistance/Repetitions Other comments   Stretching     Hamstring 5x30\"     Towel Pull     Inclined Calf 5x30\"  Added    Hip Flexion     ITB     Groin     Quad 3x30\" standing w/ foot on stool  Added                   SLR     Supine 3x10 4# ^ 5/   Abduction 3x10 4# ^ 5/   Adduction 3x10 4#  ^ 5/   Prone     SLR+ 5x30\"  Added         Isometrics     Quad sets 10x10\"          Patellar Glides     Medial     Superior     Inferior          ROM     Sheet Pulls 10x10\"    [place] Passive     Active     Weight Shift     Ankle Pumps          CKC     Calf raises 3x10  Added 4/23   Wall sits 5x30\"  ^ 5/9   Step ups L3 3x10   ^ 5/14   1 leg stand 5x30\" Aero  ^ 5/17   Squatting     CC TKE     Balance board     bridges          PRE          Quantum machines     Leg press  3x10 145# DL  ^5/21 range: 90-30   Leg extension 3x10 25# SL  ^ 5/17   Leg curl 3x10 45# SL  ^ 5/21        Bike  5'  Added 5/7                   Therapeutic Exercise and NMR EXR  [x] (07947) Provided verbal/tactile cueing for activities related to strengthening, flexibility, endurance, ROM for improvements in LE, proximal hip, and core control with self care, mobility, lifting, ambulation. [x] (17943) Provided verbal/tactile cueing for activities related to improving balance, coordination, kinesthetic sense, posture, motor skill, proprioception  to assist with LE, proximal hip, and core control in self care, mobility, lifting, ambulation and eccentric single leg control.      NMR and Therapeutic Activities:    [x] (54157 or 48501) Provided verbal/tactile cueing for activities related to improving balance, coordination, kinesthetic sense, posture, motor skill, proprioception and motor activation to allow for proper function of core, proximal hip and LE with self care and ADLs  [x] (95175) Gait Re-education- Provided training and instruction to the patient for proper LE, core and proximal hip recruitment and positioning and eccentric body weight control with ambulation re-education including up and down stairs     Home Exercise Program:    [x] (32325) Reviewed/Progressed HEP activities related to strengthening, flexibility, endurance, ROM of core, proximal hip and LE for functional self-care, mobility, lifting and ambulation/stair navigation   [] (93771)Reviewed/Progressed HEP activities related to improving balance, coordination, kinesthetic sense, posture, motor skill, proprioception of core, proximal hip and LE for self care, mobility, lifting, and ambulation/stair navigation      Manual Treatments:  PROM / STM / Oscillations-Mobs:  G-I, II, III, IV (PA's, Inf., Post.)  [] (09839) Provided manual therapy to mobilize LE, proximal hip and/or LS spine soft tissue/joints for the purpose of modulating pain, promoting relaxation,  increasing ROM, reducing/eliminating soft tissue swelling/inflammation/restriction, improving soft tissue extensibility and allowing for proper ROM for normal function with self care, mobility, lifting and ambulation. Modalities:  Vaso 15'     Charges:  Timed Code Treatment Minutes: 50   Total Treatment Minutes: 774-904     -[phone]  -203   NM  544-924   Vaso 825-840    [] EVAL (LOW) 58051 (typically 20 minutes face-to-face)  [] EVAL (MOD) 08577 (typically 30 minutes face-to-face)  [] EVAL (HIGH) 93261 (typically 45 minutes face-to-face)  [] RE-EVAL   [x] QF(03694) x  1   [] IONTO  [x] NMR (73419) x  1   [x] VASO  [] Manual (34483) x       [] Other:  [x] TA x  1    [] Mech Traction (62249)  [] ES(attended) (94298)      [] ES (un) (43232):     GOALS:   Patient stated goal: Pt will return to work without limitations     Therapist goals for Patient:   Short Term Goals: To be achieved in: 2 weeks  1. Independent in HEP and progression per patient tolerance, in order to prevent re-injury. 2. Patient will have a decrease in pain to facilitate improvement in movement, function, and ADLs as indicated by Functional Deficits. Long Term Goals: To be achieved in: 6-8 weeks  1. Disability index score of 20% or less for the LEFS to assist with reaching prior level of function. 2. Patient will demonstrate increased AROM to WNL to allow for proper joint functioning as indicated by patients Functional Deficits.    3. Patient will demonstrate an increase in Strength to good proximal hip strength and control (MMT at least 4+/5)  in LE to allow for proper functional mobility as indicated by patients Functional Deficits. 4. Patient will return to daily functional activities without increased symptoms or restriction. Progression Towards Functional goals:  [] Patient is progressing as expected towards functional goals listed. [] Progression is slowed due to complexities listed. [] Progression has been slowed due to co-morbidities. [x] Plan just implemented, too soon to assess goals progression  [] Other:     ASSESSMENT:  See eval    Treatment/Activity Tolerance:  [x] Patient tolerated treatment well [] Patient limited by fatique  [] Patient limited by pain  [] Patient limited by other medical complications  [x] Other: continue to progress strengthening. Patient education:  4/19 Patient education on PT and plan of care including diagnosis, prognosis, treatment goals and options. Patient agrees with discussed POC and treatment and is aware of rehab process. Pt was also educated on clinic layout and use of modalities. 4/29 may d/c crutches       Prognosis: [x] Good [] Fair  [] Poor    Patient Requires Follow-up: [x] Yes  [] No    PLAN: Plan to reassess ROM and strength next week. If he continues to do well plan to d/c to HEP. [x] Continue per plan of care [] Alter current plan (see comments)  [] Plan of care initiated [] Hold pending MD visit [] Discharge    Electronically signed by: Ehsan Gardiner PT, DPT    *If patient does not return for further follow ups after this date. Please consider this as the patients discharge from physical therapy.

## 2019-05-23 ENCOUNTER — HOSPITAL ENCOUNTER (OUTPATIENT)
Dept: PHYSICAL THERAPY | Age: 54
Setting detail: THERAPIES SERIES
Discharge: HOME | End: 2019-05-23
Payer: COMMERCIAL

## 2019-05-23 PROCEDURE — 97110 THERAPEUTIC EXERCISES: CPT | Performed by: PHYSICAL THERAPIST

## 2019-05-23 PROCEDURE — 97530 THERAPEUTIC ACTIVITIES: CPT | Performed by: PHYSICAL THERAPIST

## 2019-05-23 PROCEDURE — 97112 NEUROMUSCULAR REEDUCATION: CPT | Performed by: PHYSICAL THERAPIST

## 2019-05-23 PROCEDURE — 97016 VASOPNEUMATIC DEVICE THERAPY: CPT | Performed by: PHYSICAL THERAPIST

## 2019-05-23 NOTE — FLOWSHEET NOTE
[address], [address]  Phone: [phone]   Fax:     [phone]    Physical Therapy Daily Treatment Note  Date:  2019    Patient Name:  Sher Queen    :  1965  MRN: [id number]  Restrictions/Precautions:    Medical/Treatment Diagnosis Information:  · Diagnosis: S83.242D Left meniscus tear  · DOS 19  · Treatment Diagnosis: Left knee pain, effuson   Insurance/Certification information:  PT Insurance Information: [facility]   Physician Information:  Referring Practitioner: Dr. Agarwal Printers of care signed (Y/N):     Date of Patient follow up with Physician:     G-Code (if applicable):      Date G-Code Applied:     LEFS - 25% deficit     Progress Note: []  Yes  []  No  Next due by: Visit #10  PROGRESS NOTE NEXT WEEK     Latex Allergy:  [x]NO      []YES  Preferred Language for Healthcare:   [x]English       []other:    Visit # Insurance Allowable   11   [facility] (2-3x for 4-6 weeks)     Pain level:  2-3/10     SUBJECTIVE:  No issues.      OBJECTIVE:   Observation: normal gait    Portal sites healing well   Test measurements:     133 deg flexion (sheet pulls)     RESTRICTIONS/PRECAUTIONS:     Exercises/Interventions:   Exercise/Equipment Resistance/Repetitions Other comments   Stretching     Hamstring 5x30\"     Towel Pull     Inclined Calf 5x30\"  Added    Hip Flexion     ITB     Groin                    SLR     Supine 3x10 4# ^ 5/   Abduction 3x10 4# ^ 5   Adduction 3x10 4#  ^    Prone     SLR+ 5x30\"  Added         Isometrics     Quad sets 10x10\"          Patellar Glides     Medial     Superior     Inferior          ROM     Sheet Pulls 10x10\"    Hang Weights     Passive     Active     Weight Shift     Ankle Pumps          CKC     Calf raises 3x10  Added    Wall sits 5x30\"  ^    Step ups L3 3x10   ^ 5/14   1 leg stand 5x30\" Aero  ^    Squatting - sit to stand  3x10  Added 5/23   Lateral band walks 5x blue     Balance board     bridges          PRE          Quantum machines     Leg press  3x10 165# DL  ^5/23 range: 90-30   Leg extension 3x10 25# SL  ^ 5/17   Leg curl 3x10 45# SL  ^ 5/21        Bike  5'  Added 5/7                   Therapeutic Exercise and NMR EXR  [x] (08311) Provided verbal/tactile cueing for activities related to strengthening, flexibility, endurance, ROM for improvements in LE, proximal hip, and core control with self care, mobility, lifting, ambulation. [x] (76459) Provided verbal/tactile cueing for activities related to improving balance, coordination, kinesthetic sense, posture, motor skill, proprioception  to assist with LE, proximal hip, and core control in self care, mobility, lifting, ambulation and eccentric single leg control.      NMR and Therapeutic Activities:    [x] (20663 or 00523) Provided verbal/tactile cueing for activities related to improving balance, coordination, kinesthetic sense, posture, motor skill, proprioception and motor activation to allow for proper function of core, proximal hip and LE with self care and ADLs  [x] (68452) Gait Re-education- Provided training and instruction to the patient for proper LE, core and proximal hip recruitment and positioning and eccentric body weight control with ambulation re-education including up and down stairs     Home Exercise Program:    [x] (14803) Reviewed/Progressed HEP activities related to strengthening, flexibility, endurance, ROM of core, proximal hip and LE for functional self-care, mobility, lifting and ambulation/stair navigation   [] (44864)Reviewed/Progressed HEP activities related to improving balance, coordination, kinesthetic sense, posture, motor skill, proprioception of core, proximal hip and LE for self care, mobility, lifting, and ambulation/stair navigation      Manual Treatments:  PROM / STM / Oscillations-Mobs:  G-I, II, III, IV (PA's, Inf., Post.)  [] (88144) Provided manual therapy to mobilize LE, proximal hip and/or LS spine soft tissue/joints for the purpose of modulating pain, promoting relaxation,  increasing ROM, reducing/eliminating soft tissue swelling/inflammation/restriction, improving soft tissue extensibility and allowing for proper ROM for normal function with self care, mobility, lifting and ambulation. Modalities:  Vaso 15'     Charges:  Timed Code Treatment Minutes: 50   Total Treatment Minutes: 107-317     -282  -843   NM  137-971   Vaso 711-154    [] EVAL (LOW) 70247 (typically 20 minutes face-to-face)  [] EVAL (MOD) 62492 (typically 30 minutes face-to-face)  [] EVAL (HIGH) 26654 (typically 45 minutes face-to-face)  [] RE-EVAL   [x] HU(46736) x  1   [] IONTO  [x] NMR (46544) x  1   [x] VASO  [] Manual (20169) x       [] Other:  [x] TA x  1    [] Mech Traction (69655)  [] ES(attended) (60429)      [] ES (un) (89022):     GOALS:   Patient stated goal: Pt will return to work without limitations     Therapist goals for Patient:   Short Term Goals: To be achieved in: 2 weeks  1. Independent in HEP and progression per patient tolerance, in order to prevent re-injury. 2. Patient will have a decrease in pain to facilitate improvement in movement, function, and ADLs as indicated by Functional Deficits. Long Term Goals: To be achieved in: 6-8 weeks  1. Disability index score of 20% or less for the LEFS to assist with reaching prior level of function. 2. Patient will demonstrate increased AROM to WNL to allow for proper joint functioning as indicated by patients Functional Deficits. 3. Patient will demonstrate an increase in Strength to good proximal hip strength and control (MMT at least 4+/5)  in LE to allow for proper functional mobility as indicated by patients Functional Deficits. 4. Patient will return to daily functional activities without increased symptoms or restriction.      Progression Towards Functional goals:  [] Patient is progressing as expected towards functional goals listed. [] Progression is slowed due to complexities listed. [] Progression has been slowed due to co-morbidities. [x] Plan just implemented, too soon to assess goals progression  [] Other:     ASSESSMENT:  See eval    Treatment/Activity Tolerance:  [x] Patient tolerated treatment well [] Patient limited by fatique  [] Patient limited by pain  [] Patient limited by other medical complications  [x] Other: Pt did well today without complaints of pain or discomfort. Continue to stress the importance of compliance with his HEP.  5/23    Patient education:  4/19 Patient education on PT and plan of care including diagnosis, prognosis, treatment goals and options. Patient agrees with discussed POC and treatment and is aware of rehab process. Pt was also educated on clinic layout and use of modalities. 4/29 may d/c crutches       Prognosis: [x] Good [] Fair  [] Poor    Patient Requires Follow-up: [x] Yes  [] No    PLAN: Plan to reassess ROM and strength next week. If he continues to do well plan to d/c to HEP. [x] Continue per plan of care [] Alter current plan (see comments)  [] Plan of care initiated [] Hold pending MD visit [] Discharge    Electronically signed by: Vivian Loges PT, DPT    *If patient does not return for further follow ups after this date. Please consider this as the patients discharge from physical therapy.

## 2019-05-28 ENCOUNTER — HOSPITAL ENCOUNTER (OUTPATIENT)
Dept: PHYSICAL THERAPY | Age: 54
Setting detail: THERAPIES SERIES
Discharge: HOME | End: 2019-05-28
Payer: COMMERCIAL

## 2019-05-28 PROCEDURE — 97112 NEUROMUSCULAR REEDUCATION: CPT | Performed by: PHYSICAL THERAPIST

## 2019-05-28 PROCEDURE — 97016 VASOPNEUMATIC DEVICE THERAPY: CPT | Performed by: PHYSICAL THERAPIST

## 2019-05-28 PROCEDURE — 97530 THERAPEUTIC ACTIVITIES: CPT | Performed by: PHYSICAL THERAPIST

## 2019-05-28 PROCEDURE — 97110 THERAPEUTIC EXERCISES: CPT | Performed by: PHYSICAL THERAPIST

## 2019-05-28 NOTE — FLOWSHEET NOTE
[facility] 64, [address] [employer] [address] [place] [address], [address] [place] [address]  Phone: [phone]   Fax:     [phone]    Physical Therapy Daily Treatment Note  Date:  2019    Patient Name:  Kendra Avelar    :  1965  MRN: [id number]  Restrictions/Precautions:    Medical/Treatment Diagnosis Information:  · Diagnosis: S83.242D Left meniscus tear  · DOS 19  · Treatment Diagnosis: Left knee pain, effuson   Insurance/Certification information:  PT Insurance Information: [facility]   Physician Information:  Referring Practitioner: Dr. Kentrell Fox of care signed (Y/N):     Date of Patient follow up with Physician:     G-Code (if applicable):      Date G-Code Applied:     LEFS - 25% deficit     Progress Note: []  Yes  []  No  Next due by: Visit #10  PROGRESS NOTE NEXT WEEK     Latex Allergy:  [x]NO      []YES  Preferred Language for Healthcare:   [x]English       []other:    Visit # Insurance Allowable   12   [facility] (2-3x for 4-6 weeks)     Pain level:  2-3/10     SUBJECTIVE:  Pt reports he went back to work and had increased swelling after he took his brace off.      OBJECTIVE:   Observation: normal gait    Portal sites healing well   Test measurements:     133 deg flexion (sheet pulls)     RESTRICTIONS/PRECAUTIONS:     Exercises/Interventions:   Exercise/Equipment Resistance/Repetitions Other comments   Stretching     Hamstring 5x30\"     Towel Pull     Inclined Calf 5x30\"  Added    Hip Flexion     ITB     Groin                    SLR     Supine 3x10 5# ^    Abduction 3x10 5# ^    Adduction 3x10 5#  ^    Prone     SLR+ 5x30\"  Added         Isometrics     Quad sets 10x10\"          Patellar Glides     Medial     Superior     Inferior          ROM     Sheet Pulls 10x10\"    Hang Weights     Passive     Active     Weight Shift     Ankle Pumps          CKC     Calf raises 3x10  Added    Wall sits 5x30\"  ^ 5/9   Step ups L3 3x10   ^ 5/14   1 leg stand 5x30\" Aero  ^ 5/17   Squatting - sit to stand  3x10  Added 5/23   Lateral band walks 5x blue     Balance board     bridges          PRE          Quantum machines     Leg press  3x10 165# DL  ^5/23 range: 90-30   Leg extension 3x10 25# SL  ^ 5/17   Leg curl 3x10 45# SL  ^ 5/21        Bike  5'  Added 5/7                   Therapeutic Exercise and NMR EXR  [x] (09617) Provided verbal/tactile cueing for activities related to strengthening, flexibility, endurance, ROM for improvements in LE, proximal hip, and core control with self care, mobility, lifting, ambulation. [x] (32767) Provided verbal/tactile cueing for activities related to improving balance, coordination, kinesthetic sense, posture, motor skill, proprioception  to assist with LE, proximal hip, and core control in self care, mobility, lifting, ambulation and eccentric single leg control.      NMR and Therapeutic Activities:    [x] (00707 or 70738) Provided verbal/tactile cueing for activities related to improving balance, coordination, kinesthetic sense, posture, motor skill, proprioception and motor activation to allow for proper function of core, proximal hip and LE with self care and ADLs  [x] (46422) Gait Re-education- Provided training and instruction to the patient for proper LE, core and proximal hip recruitment and positioning and eccentric body weight control with ambulation re-education including up and down stairs     Home Exercise Program:    [x] (04573) Reviewed/Progressed HEP activities related to strengthening, flexibility, endurance, ROM of core, proximal hip and LE for functional self-care, mobility, lifting and ambulation/stair navigation   [] (68626)Reviewed/Progressed HEP activities related to improving balance, coordination, kinesthetic sense, posture, motor skill, proprioception of core, proximal hip and LE for self care, mobility, lifting, and ambulation/stair navigation      Manual Treatments:  PROM / STM / Oscillations-Mobs:  G-I, II, III, IV (PA's, Inf., Post.)  [] (89916) Provided manual therapy to mobilize LE, proximal hip and/or LS spine soft tissue/joints for the purpose of modulating pain, promoting relaxation,  increasing ROM, reducing/eliminating soft tissue swelling/inflammation/restriction, improving soft tissue extensibility and allowing for proper ROM for normal function with self care, mobility, lifting and ambulation. Modalities:  Vaso 15'     Charges:  Timed Code Treatment Minutes: 50   Total Treatment Minutes: 12:00-1:25     TE [id number]  TA 9658-8032   NM  1245-100   Vaso 110-125    [] EVAL (LOW) 15769 (typically 20 minutes face-to-face)  [] EVAL (MOD) 64507 (typically 30 minutes face-to-face)  [] EVAL (HIGH) 83149 (typically 45 minutes face-to-face)  [] RE-EVAL   [x] BH(39447) x  1   [] IONTO  [x] NMR (18328) x  1   [x] VASO  [] Manual (63999) x       [] Other:  [x] TA x  1    [] Mech Traction (41251)  [] ES(attended) (82259)      [] ES (un) (53513):     GOALS:   Patient stated goal: Pt will return to work without limitations     Therapist goals for Patient:   Short Term Goals: To be achieved in: 2 weeks  1. Independent in HEP and progression per patient tolerance, in order to prevent re-injury. 2. Patient will have a decrease in pain to facilitate improvement in movement, function, and ADLs as indicated by Functional Deficits. Long Term Goals: To be achieved in: 6-8 weeks  1. Disability index score of 20% or less for the LEFS to assist with reaching prior level of function. 2. Patient will demonstrate increased AROM to WNL to allow for proper joint functioning as indicated by patients Functional Deficits. 3. Patient will demonstrate an increase in Strength to good proximal hip strength and control (MMT at least 4+/5)  in LE to allow for proper functional mobility as indicated by patients Functional Deficits.    4. Patient will return to daily functional activities without increased symptoms or restriction. Progression Towards Functional goals:  [] Patient is progressing as expected towards functional goals listed. [] Progression is slowed due to complexities listed. [] Progression has been slowed due to co-morbidities. [x] Plan just implemented, too soon to assess goals progression  [] Other:     ASSESSMENT:  See eval    Treatment/Activity Tolerance:  [x] Patient tolerated treatment well [] Patient limited by fatique  [] Patient limited by pain  [] Patient limited by other medical complications  [x] Other: Pt did well today without complaints of pain or discomfort. Continue to stress the importance of compliance with his HEP.  5/28    Patient education:  4/19 Patient education on PT and plan of care including diagnosis, prognosis, treatment goals and options. Patient agrees with discussed POC and treatment and is aware of rehab process. Pt was also educated on clinic layout and use of modalities. 4/29 may d/c crutches       Prognosis: [x] Good [] Fair  [] Poor    Patient Requires Follow-up: [x] Yes  [] No    PLAN: Plan to reassess ROM and strength next week. If he continues to do well plan to d/c to HEP. [x] Continue per plan of care [] Alter current plan (see comments)  [] Plan of care initiated [] Hold pending MD visit [] Discharge    Electronically signed by: Kristie Leblanc PT, DPT    *If patient does not return for further follow ups after this date. Please consider this as the patients discharge from physical therapy.

## 2019-05-30 ENCOUNTER — HOSPITAL ENCOUNTER (OUTPATIENT)
Dept: PHYSICAL THERAPY | Age: 54
Setting detail: THERAPIES SERIES
Discharge: HOME | End: 2019-05-30
Payer: COMMERCIAL

## 2019-05-30 PROCEDURE — 97016 VASOPNEUMATIC DEVICE THERAPY: CPT | Performed by: PHYSICAL THERAPIST

## 2019-05-30 PROCEDURE — 97530 THERAPEUTIC ACTIVITIES: CPT | Performed by: PHYSICAL THERAPIST

## 2019-05-30 PROCEDURE — 97112 NEUROMUSCULAR REEDUCATION: CPT | Performed by: PHYSICAL THERAPIST

## 2019-05-30 PROCEDURE — 97110 THERAPEUTIC EXERCISES: CPT | Performed by: PHYSICAL THERAPIST

## 2019-05-30 NOTE — DISCHARGE SUMMARY
PT Insurance Information: [facility]   Physician Information:  Referring Practitioner: Dr. Kentrell Fox of care signed (Y/N):     Date of Patient follow up with Physician:     G-Code (if applicable):      Date G-Code Applied:  5/30    LEFS - 28.75% deficit     Progress Note: []  Yes  []  No  Next due by: Visit #10  PROGRESS NOTE NEXT WEEK     Latex Allergy:  [x]NO      []YES  Preferred Language for Healthcare:   [x]English       []other:    Visit # Insurance Allowable   13  5/30 [facility] (2-3x for 4-6 weeks)     Pain level:  2-3/10     SUBJECTIVE:  No new issues.   5/30    OBJECTIVE:   Observation: normal gait 5/30   Portal sites healed 5/30  Test measurements:                               ROM 5/30 PROM AROM Overpressure Comment     L R L R L R     Flexion     128 128         Extension     0 0                                                   Strength 5/30 L R Comment   Quad  5 5     Hamstring  4 4+     Gastroc  5 5     Hip  flexion  5 5     Hip abd  5 5                                  RESTRICTIONS/PRECAUTIONS:     Exercises/Interventions:   Exercise/Equipment Resistance/Repetitions Other comments   Stretching     Hamstring 5x30\"     Towel Pull     Inclined Calf 5x30\"  Added 4/23   Hip Flexion     ITB     Groin                    SLR     Supine 3x10 5# ^ 5/28   Abduction 3x10 5# ^ 5/28   Adduction 3x10 5#  ^ 5/28   Prone     SLR+ 5x30\"  Added 4/25        Isometrics     Quad sets 10x10\"          Patellar Glides     Medial     Superior     Inferior          ROM     Sheet Pulls 10x10\"    Hang Weights     Passive     Active     Weight Shift     Ankle Pumps          CKC     Calf raises 3x10  Added 4/23   Wall sits 5x30\"  ^ 5/9   Step ups L3 3x10   ^ 5/14   1 leg stand 5x30\" Aero  ^ 5/17   Squatting - sit to stand  3x10  Added 5/23   Lateral band walks 5x blue     Balance board     bridges          PRE          Quantum machines     Leg press  3x10 165# DL  ^5/23 range: 90-30   Leg extension 3x10 25# SL  ^ 5/17   Leg curl 3x10 45# SL  ^ 5/21        Bike  5'  Added 5/7                   Therapeutic Exercise and NMR EXR  [x] (35083) Provided verbal/tactile cueing for activities related to strengthening, flexibility, endurance, ROM for improvements in LE, proximal hip, and core control with self care, mobility, lifting, ambulation. [x] (43797) Provided verbal/tactile cueing for activities related to improving balance, coordination, kinesthetic sense, posture, motor skill, proprioception  to assist with LE, proximal hip, and core control in self care, mobility, lifting, ambulation and eccentric single leg control.      NMR and Therapeutic Activities:    [x] (22705 or 08738) Provided verbal/tactile cueing for activities related to improving balance, coordination, kinesthetic sense, posture, motor skill, proprioception and motor activation to allow for proper function of core, proximal hip and LE with self care and ADLs  [x] (67311) Gait Re-education- Provided training and instruction to the patient for proper LE, core and proximal hip recruitment and positioning and eccentric body weight control with ambulation re-education including up and down stairs     Home Exercise Program:    [x] (59316) Reviewed/Progressed HEP activities related to strengthening, flexibility, endurance, ROM of core, proximal hip and LE for functional self-care, mobility, lifting and ambulation/stair navigation   [] (73482)Reviewed/Progressed HEP activities related to improving balance, coordination, kinesthetic sense, posture, motor skill, proprioception of core, proximal hip and LE for self care, mobility, lifting, and ambulation/stair navigation      Manual Treatments:  PROM / STM / Oscillations-Mobs:  G-I, II, III, IV (PA's, Inf., Post.)  [] (67374) Provided manual therapy to mobilize LE, proximal hip and/or LS spine soft tissue/joints for the purpose of modulating pain, promoting relaxation,  increasing ROM, reducing/eliminating soft tissue swelling/inflammation/restriction, improving soft tissue extensibility and allowing for proper ROM for normal function with self care, mobility, lifting and ambulation. Modalities:  Vaso 15'     Charges:  Timed Code Treatment Minutes: 40   Total Treatment Minutes: 272-123     -[phone]  -931   NM  915-930   Vaso 423-741    [] EVAL (LOW) 84950 (typically 20 minutes face-to-face)  [] EVAL (MOD) 89930 (typically 30 minutes face-to-face)  [] EVAL (HIGH) 75836 (typically 45 minutes face-to-face)  [] RE-EVAL   [x] KZ(28925) x  1   [] IONTO  [x] NMR (25688) x  1   [x] VASO  [] Manual (85173) x       [] Other:  [x] TA x  1    [] Mech Traction (74984)  [] ES(attended) (64220)      [] ES (un) (76860):     GOALS:   Patient stated goal: Pt will return to work without limitations     Therapist goals for Patient:   Short Term Goals: To be achieved in: 2 weeks  1. Independent in HEP and progression per patient tolerance, in order to prevent re-injury. met   2. Patient will have a decrease in pain to facilitate improvement in movement, function, and ADLs as indicated by Functional Deficits. met    Long Term Goals: To be achieved in: 6-8 weeks  1. Disability index score of 20% or less for the LEFS to assist with reaching prior level of function. In progress  2. Patient will demonstrate increased AROM to WNL to allow for proper joint functioning as indicated by patients Functional Deficits. Met  3. Patient will demonstrate an increase in Strength to good proximal hip strength and control (MMT at least 4+/5)  in LE to allow for proper functional mobility as indicated by patients Functional Deficits. In progress  4. Patient will return to daily functional activities without increased symptoms or restriction. Met     Progression Towards Functional goals:  [x] Patient is progressing as expected towards functional goals listed. [] Progression is slowed due to complexities listed.   [] Progression has been slowed due to co-morbidities. [] Plan just implemented, too soon to assess goals progression  [] Other:     ASSESSMENT:  See eval    Treatment/Activity Tolerance:  [x] Patient tolerated treatment well [] Patient limited by fatique  [] Patient limited by pain  [] Patient limited by other medical complications  [] Other:     Patient education:  4/19 Patient education on PT and plan of care including diagnosis, prognosis, treatment goals and options. Patient agrees with discussed POC and treatment and is aware of rehab process. Pt was also educated on clinic layout and use of modalities. 4/29 may d/c crutches       Prognosis: [x] Good [] Fair  [] Poor    Patient Requires Follow-up: [x] Yes  [] No    PLAN: D/C to independent HEP. Told to call with any questions or concerns     [] Continue per plan of care [] Alter current plan (see comments)  [] Plan of care initiated [] Hold pending MD visit [x] Discharge    Electronically signed by: Jailene Ruiz PT, DPT    *If patient does not return for further follow ups after this date. Please consider this as the patients discharge from physical therapy.

## 2019-06-21 ENCOUNTER — OFFICE VISIT (OUTPATIENT)
Dept: ORTHOPEDIC SURGERY | Age: 54
End: 2019-06-21

## 2019-06-21 VITALS
HEIGHT: 74 IN | HEART RATE: 70 BPM | BODY MASS INDEX: 28.88 KG/M2 | SYSTOLIC BLOOD PRESSURE: 119 MMHG | WEIGHT: 225 LBS | DIASTOLIC BLOOD PRESSURE: 77 MMHG

## 2019-06-21 DIAGNOSIS — Z98.890 S/P LEFT KNEE ARTHROSCOPY: Primary | ICD-10-CM

## 2019-06-21 PROCEDURE — 99024 POSTOP FOLLOW-UP VISIT: CPT | Performed by: ORTHOPAEDIC SURGERY

## 2019-06-21 NOTE — PROGRESS NOTES
[facility]  Follow Up Knee Pain      Chief Complaint    Follow-up (left knee. s/p 2 months menisectomy. doing well. )      History of Present Illness:  Kendra Avelar is a 46y.o. year old male who presents 2 months after a left knee partial medial meniscectomy. Patient is completed his physical therapy and states that he has no pain or issues and denies any recurrent effusions or mechanical symptoms. He is ready to go back to work and otherwise reports no questions or concerns     Pain Assessment  Location of Pain: Knee  Location Modifiers: Left  Severity of Pain: 0  Quality of Pain: (no pain )  Frequency of Pain: Rarely  Aggravating Factors: (no aggravating factors )  Limiting Behavior: No  Relieving Factors: Rest  Result of Injury: No  Work-Related Injury: No  Are there other pain locations you wish to document?: No    Past Medical History:   Diagnosis Date    Hyperlipidemia     Hypertension       Social History     Socioeconomic History    Marital status:      Spouse name: Not on file    Number of children: Not on file    Years of education: Not on file    Highest education level: Not on file   Occupational History    Not on file   Social Needs    Financial resource strain: Not on file    Food insecurity:     Worry: Not on file     Inability: Not on file    Transportation needs:     Medical: Not on file     Non-medical: Not on file   Tobacco Use    Smoking status: Never Smoker    Smokeless tobacco: Never Used   Substance and Sexual Activity    Alcohol use:  Yes    Drug use: No    Sexual activity: Not on file   Lifestyle    Physical activity:     Days per week: Not on file     Minutes per session: Not on file    Stress: Not on file   Relationships    Social connections:     Talks on phone: Not on file     Gets together: Not on file     Attends Yazidi service: Not on file     Active member of club or organization: Not on file     Attends meetings of clubs or organizations: Not on file     Relationship status: Not on file    Intimate partner violence:     Fear of current or ex partner: Not on file     Emotionally abused: Not on file     Physically abused: Not on file     Forced sexual activity: Not on file   Other Topics Concern    Not on file   Social History Narrative    Not on file     Current Outpatient Medications on File Prior to Visit   Medication Sig Dispense Refill    Multiple Vitamins-Minerals (THERAPEUTIC MULTIVITAMIN-MINERALS) tablet Take 1 tablet by mouth daily      hydrochlorothiazide (HYDRODIURIL) 25 MG tablet Take 25 mg by mouth daily      lisinopril (PRINIVIL;ZESTRIL) 20 MG tablet Take 20 mg by mouth daily      Fexofenadine-Pseudoephedrine (ALLEGRA-D PO) Take by mouth      pravastatin (PRAVACHOL) 40 MG tablet Take 40 mg by mouth daily      aspirin 81 MG tablet Take 81 mg by mouth daily      omeprazole (PRILOSEC) 20 MG delayed release capsule Take 20 mg by mouth daily       No current facility-administered medications on file prior to visit. No Known Allergies    Review of Systems:  A 14 point review of systems available in the scanned medical record as documented by the patient. The review is negative with the exception of those things mentioned in the HPI and Past Medical History. Vital Signs:  Vitals:    06/21/19 1121   BP: 119/77   Pulse: 70         General Exam:     Neuro: Alert & oriented x 3,  normal,  no focal deficits noted. Normal affect. Eyes: sclera clear  Ears: Normal external ear  Lymph: no lymphedema  Pulm: Respirations unlabored and regular  Pulse: Regular rate and rhythm   Skin: Warm, well perfused    Musculoskeletal:    Left knee Exam:   Overall alignment is varus     Gait/Alignment: No use of assistive devices.      Inspection/Skin: Skin is intact without erythema or ecchymosis. No significant swelling. No deformity.   Healed surgical incision     Effusion; none.      Palpation: Appreciable points of tenderness. Calf compartments are soft and non-tender. No signs of DVT.      Range of Motion: From 0 to 120 degrees of flexion without pain.     Strength: 5/5 strength of the quadriceps and hamstrings.      Ligamentous Stability: Stable to valgus and varus stress testing at 0° and 30°. Lachman's has a solid endpoint. No posterior sag. Negative posterior drawer.     Neurologic and vascular: Normal motor and sensory function in the deep peroneal, superficial peroneal, tibial, saphenous, and sural nerve distributions. Foot warm and well perfused        Comparison right knee Exam:   Overall alignment is varus     Gait/Alignment: No use of assistive devices.      Inspection/Skin: Skin is intact without erythema or ecchymosis. No significant swelling. No deformity.      Effusion; none.      Palpation: Nontender to the medial and lateral joint lines. Good patella mobilization. Negative patellar grind. Calf compartments are soft and non-tender. No signs of DVT.      Range of Motion: From 0 to 120 degrees of flexion without pain.     Strength: 5/5 strength of the quadriceps and hamstrings.      Ligamentous Stability: Stable to valgus and varus stress testing at 0° and 30°. Lachman's has a solid endpoint. No posterior sag. Negative posterior drawer.     Neurologic and vascular: Normal motor and sensory function in the deep peroneal, superficial peroneal, tibial, saphenous, and sural nerve distributions. Foot warm and well perfused       Impression: Healing well from the left knee partial medial meniscectomy    Visit Diagnoses:   Diagnosis Orders   1. S/P left knee arthroscopy         Office Procedures:  No orders of the defined types were placed in this encounter. Treatment Plan: Diagnoses and treatments were reviewed with the patient today. Patient education material was provided. Questions were entertained and answered to the patient's satisfaction.     At this point patient can continue with activities as tolerated and follow-up with us on an as-needed basis. 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 Sincerely,    German Low MD  Clinical Fellow in [address] [place] [address] Orthopaedic Surgeon  [address] [facility]    I attest that my visit today with Katiuska Flannery was supervised by Dr Juliette Espitia    This dictation was performed with a verbal recognition program [facility] ) and it was checked for errors. It is possible that there are still dictated errors within this office note. If so, please bring any errors to my attention for an addendum. All efforts were made to ensure that this office note is accurate. I supervised my sports medicine fellow in the evaluation and development of a treatment plan  for this patient. I personally interviewed the patient and performed the physical examination. In addition, I discussed the diagnosis and treatment options. All of the patient's questions were answered. I personally reviewed the patient's pain scale, review of systems, family history, social history, past medical history, allergies and medications. Review of systems was collected today, reviewed and is included in the medical record. It is available under the media tab. Pradeep Escamilla MD  Sports Medicine, Arthroscopic Knee and Shoulder Surgery    This dictation was performed with a verbal recognition program [facility] ) and it was checked for errors. It is possible that there are still dictated errors within this office note. If so, please bring any errors to my attention for an addendum. All efforts were made to ensure that this office note is accurate.

## 2019-06-21 NOTE — LETTER
[place] [address]  Phone: [phone]  Fax: [phone]    Mona Arana MD        June 21, 2019     Patient: Vanessa Rehman   YOB: 1965   Date of Visit: 6/21/2019       To Whom It May Concern: It is my medical opinion that Kaylynn Marrero may return to work on Sunday, June 23rd, 2019 with no restrictions. .    If you have any questions or concerns, please don't hesitate to call.     Sincerely,        Mona Arana MD

## 2020-06-27 ENCOUNTER — OFFICE VISIT (OUTPATIENT)
Dept: PRIMARY CARE CLINIC | Age: 55
End: 2020-06-27

## 2020-06-28 LAB — SARS-COV-2, PCR: NOT DETECTED

## (undated) DEVICE — COVER,MAYO STAND,XL,STERILE: Brand: MEDLINE

## (undated) DEVICE — SUTURE MCRYL SZ 4-0 L27IN ABSRB UD L19MM PS-2 1/2 CIR PRIM Y426H

## (undated) DEVICE — SOLUTION IRRIG 3000ML LAC R FLX CONT

## (undated) DEVICE — COVER LT HNDL BLU PLAS

## (undated) DEVICE — PAD,NON-ADHERENT,3X8,STERILE,LF,1/PK: Brand: MEDLINE

## (undated) DEVICE — STERILE POLYISOPRENE POWDER-FREE SURGICAL GLOVES WITH EMOLLIENT COATING: Brand: PROTEXIS

## (undated) DEVICE — TURNOVER KIT RM INF CTRL TECH

## (undated) DEVICE — GOWN,SIRUS,POLYRNF,BRTHSLV,XL,30/CS: Brand: MEDLINE

## (undated) DEVICE — GOWN,SIRUS,POLYRNF,BRTHSLV,XLN/XXL,18/CS: Brand: MEDLINE

## (undated) DEVICE — TUBING PMP L6FT CONT WAVE EXTN

## (undated) DEVICE — PAD DRY FLOOR ABS 32X58IN GRN

## (undated) DEVICE — TUBING PMP L16FT MAIN DISP FOR AR-6400 AR-6475

## (undated) DEVICE — SKIN AFFIX SURG ADHESIVE 72/CS 0.55ML: Brand: MEDLINE

## (undated) DEVICE — GLOVE SURG SZ 65 L12IN FNGR THK87MIL WHT LTX FREE

## (undated) DEVICE — 3M™ STERI-DRAPE™ U-DRAPE 1015: Brand: STERI-DRAPE™

## (undated) DEVICE — TUBING FLD MGMT Y DBL SPIK DUALWAVE

## (undated) DEVICE — BLADE SHV L13CM DIA4MM TAPR TIP SCIS LIKE CUT OVL OUTER

## (undated) DEVICE — CHLORAPREP 26ML ORANGE

## (undated) DEVICE — 3M™ COBAN™ NL STERILE NON-LATEX SELF-ADHERENT WRAP, 2086S, 6 IN X 5 YD (15 CM X 4,5 M), 12 ROLLS/CASE: Brand: 3M™ COBAN™

## (undated) DEVICE — PACK PROCEDURE SURG ARTHROSCOPY

## (undated) DEVICE — SURE SET-DOUBLE BASIN-LF: Brand: MEDLINE INDUSTRIES, INC.

## (undated) DEVICE — BLADE SHV L13CM DIA4MM EXCALIBUR AGG COOLCUT

## (undated) DEVICE — GLOVE SURG SZ 9 THK91MIL LTX FREE SYN POLYISOPRENE ANTI